# Patient Record
Sex: FEMALE | Race: WHITE | NOT HISPANIC OR LATINO | URBAN - METROPOLITAN AREA
[De-identification: names, ages, dates, MRNs, and addresses within clinical notes are randomized per-mention and may not be internally consistent; named-entity substitution may affect disease eponyms.]

---

## 2017-01-12 ENCOUNTER — OFFICE VISIT (OUTPATIENT)
Dept: DERMATOLOGY | Facility: CLINIC | Age: 67
End: 2017-01-12

## 2017-01-12 DIAGNOSIS — L25.9 CONTACT DERMATITIS, UNSPECIFIED CONTACT DERMATITIS TYPE, UNSPECIFIED TRIGGER: Primary | ICD-10-CM

## 2017-01-12 RX ORDER — AZITHROMYCIN 250 MG/1
TABLET, FILM COATED ORAL
COMMUNITY

## 2017-01-12 RX ORDER — TACROLIMUS 1 MG/G
OINTMENT TOPICAL
COMMUNITY
Start: 2016-08-08

## 2017-01-12 RX ORDER — EPINASTINE HCL 0.05 %
DROPS OPHTHALMIC (EYE)
COMMUNITY
Start: 2016-12-02

## 2017-01-12 ASSESSMENT — PAIN SCALES - GENERAL: PAINLEVEL: NO PAIN (0)

## 2017-01-12 NOTE — NURSING NOTE
Dermatology Rooming Note    Kortney Yanira Shaina's goals for this visit include:   Chief Complaint   Patient presents with     Derm Problem     Kortney comes to clinic for dermatitis. Kortney states her eyes have been affected since April     Sushma Sandhu LPN

## 2017-01-12 NOTE — Clinical Note
1/12/2017       RE: Kortney Merritt  [address]  [facility] [address]     Dear Colleague,    Thank you for referring your patient, Kortney Merritt, to the [facility] DERMATOLOGY at [facility]. Please see a copy of my visit note below.    [facility] Dermatology Note      Dermatology Problem List:   1. Presumed Allergic Contact Dermatitis: Eyelids. Unclear precipitant/contactant. Bilateral dramatic eyelid swelling, erythema, discharge and overall presentation consisten..  Previously negative T.R.U.E. tests and prick testing with Allergy.  Concern regarding topical medication drops (preservatives) and punctal plugs placed just prior to eruption. Referred to [facility] for patch testing.      Encounter Date:  01/12/2017.      CC:  Eye irritation.      History of Present Illness:   Kortney Merritt is a pleasant 66-year-old woman who is seen today in self referral as a new patient for evaluation of eye irritation.  She was in her previous state of health until April, at which point she developed significant and progressive eye irritation.  She has notably had swelling, edema, erythema, pruritus of the bilateral upper and lower eyelids, as well as persistent drainage of the surrounding eyelid skin and eyes themselves.  This notably occurred approximately 1-1-1/2 months after having tear duct plugs placed for chronic dry eye by her ophthalmologist.  At the time that she was developing this rash, she was using Refresh eye drops.  She subsequently transitioned to Systane and then followup GenTeal drops.  She was T.R.U.E. tested to the T.R.U.E. panel as well as the Refresh and GenTeal drops without any significant reactions.  She had negative prick testing performed by her allergist, Dr. Martin.  She at one point was using Maxitrol which is neomycin, polymyxin and dexamethasone ointment without any improvement and now with significant worsening, as well as tobramycin.  She  "is not using any topical antibiotics at this point.  She has never had any issues/allergies with metal exposures.  No other areas of skin involvement.  No difficulties on the head, neck, scalp and hands, among others.  She is significantly bothered by her symptoms both esthetically as well as symptomatically and is hoping that we can figure out what this is.  She has questions whether or not these could be related to the eye plugs that she has had placed but she has been reassured by her ophthalmologist that this is less likely.  She prefers that we do not contact the ophthalmologist until we know what the cause of this is.  At this point, she is using Protopic ointment once daily and epinastine drops which have been mildly palliative for her.  She stopped all other topicals.  She is no longer using any moisturizers.  Intermittently previously she was using castor oil and coconut oil without any significant improvement in her irritation.      Past Medical History:   She had \"rashes and blisters\" in childhood that resolved.      Social History:   She is a retired [employer] .  She has hobbies that include gardening as well as knitting, outdoors.  No significant exposures beyond gardening in the time frame when this started.      Family History:   Negative for dermatologic difficulties.  No one else with an itchy rash in the family.     Current Outpatient Prescriptions   Medication     autologous serum compounded ophthalmic solution     epinastine HCl (ELESTAT) 0.05 % SOLN     hypromellose (GENTEAL) ophthalmic gel 0.3%     Polyethyl Glycol-Propyl Glycol (SYSTANE ULTRA PF OP)     tacrolimus (PROTOPIC) 0.1 % ointment     Lifitegrast (XIIDRA) 5 % SOLN     azithromycin (ZITHROMAX) 250 MG tablet     NO ACTIVE MEDICATIONS     No current facility-administered medications for this visit.      Allergies   Allergen Reactions     Sulfa Drugs Hives         Review of Systems:   A 4-point " review of systems is negative beyond that stated above in HPI.  No fevers, chills, sweats, ulcerations, among others.      Physical exam:   GENERAL:  A well-appearing woman in no acute distress.  Appearance is stated age.  Affect normal.     SKIN:  A skin examination, limited today to the scalp, head and neck, upper chest, back, upper extremities, lower extremities and palms, was performed today.    -Notably, periocular skin and eyelids are significantly edematous, pink, weeping with mild crusting and mattering.  Conjunctivae are largely within normal limits.  The erythema and skin inflammation appears to be focally worse over the medial canthus.   -No other notable concerning findings on examination today.  No other rash.      Impression/Plan:   1. Presumed Allergic Contact Dermatitis, eyelids.     History and presentation is most consistent with allergic contact dermatitis. Impressive/dramatic presentation today.   Negative T.R.U.E. testing and testing to Refresh and GenTeal drops previously.  At this point, we are most concerned about reaction to preservative/component of occular drops vs. less likely recently placed punctal plugs ~1 month prior to developing this eruption.  Although her two consistently used occular drops were negative with patch testing, ophthalmic solutions are notorious for negative patch test results.   -After visit in clinic today, Dr. Rod from [facility] patch test clinic was curbsided to discuss, and recommended 2 week BID use test to the volar arms. Will call patient to discuss these recommendations.   -In addition, will refer the patient for extensive patch testing at [facility] with either Dr. Edwards or Dr. Mendy Rod. This could potentially be discontinued if use test is fruitful.  The patient will bring in all of her products that she has used on the skin over the last year.  She understands she should continue with her previous topical regimen including Protopic  each day at bedtime and epinastine drops.    -She was encouraged to contact her ophthalmologist to determine material of punctal plugs and/or obtain a sample. We assume this is silicone (highly unlikely to be allergenic) but would be important to confirm.  -The patient is understanding of our plan.  All questions were answered to apparent satisfaction.    -Will follow up in 2 months following patch testing.      Dr. Janee Hand staffed the patient.     This note was dictated and edited by MD Alireza Valera MD  PGY2 Dermatology  [phone]        Staff Involved:   Dictated by Resident (Alireza Thayer MD)/Staff(as above)     .I was present for key portions of the history and exam.  See resident note for pertinent history, exam, and treatment plan.  Janee Hand MD

## 2017-01-12 NOTE — PATIENT INSTRUCTIONS
[place] [facility]  [facility]  [address], [address]  [facility] [address]  Hours are:  8AM - 4:30PM M-F  Call to make Appointment : [phone]  with Dr. Rod or Dr. Rod  For more information and parking:  [email or web address] [place] [facility]/clinics/[place] [facility]_[email or web address]

## 2017-01-12 NOTE — PROGRESS NOTES
[facility] Dermatology Note      Dermatology Problem List:   1. Presumed Allergic Contact Dermatitis: Eyelids. Unclear precipitant/contactant. Bilateral dramatic eyelid swelling, erythema, discharge and overall presentation consisten..  Previously negative T.R.U.E. tests and prick testing with Allergy.  Concern regarding topical medication drops (preservatives) and punctal plugs placed just prior to eruption. Referred to [facility] for patch testing.      Encounter Date:  01/12/2017.      CC:  Eye irritation.      History of Present Illness:   Kortney Merritt is a pleasant 66-year-old woman who is seen today in self referral as a new patient for evaluation of eye irritation.  She was in her previous state of health until April, at which point she developed significant and progressive eye irritation.  She has notably had swelling, edema, erythema, pruritus of the bilateral upper and lower eyelids, as well as persistent drainage of the surrounding eyelid skin and eyes themselves.  This notably occurred approximately 1-1-1/2 months after having tear duct plugs placed for chronic dry eye by her ophthalmologist.  At the time that she was developing this rash, she was using Refresh eye drops.  She subsequently transitioned to Systane and then followup GenTeal drops.  She was T.R.U.E. tested to the T.R.U.E. panel as well as the Refresh and GenTeal drops without any significant reactions.  She had negative prick testing performed by her allergist, Dr. Martin.  She at one point was using Maxitrol which is neomycin, polymyxin and dexamethasone ointment without any improvement and now with significant worsening, as well as tobramycin.  She is not using any topical antibiotics at this point.  She has never had any issues/allergies with metal exposures.  No other areas of skin involvement.  No difficulties on the head, neck, scalp and hands, among others.  She is significantly bothered by her symptoms both esthetically as  "well as symptomatically and is hoping that we can figure out what this is.  She has questions whether or not these could be related to the eye plugs that she has had placed but she has been reassured by her ophthalmologist that this is less likely.  She prefers that we do not contact the ophthalmologist until we know what the cause of this is.  At this point, she is using Protopic ointment once daily and epinastine drops which have been mildly palliative for her.  She stopped all other topicals.  She is no longer using any moisturizers.  Intermittently previously she was using castor oil and coconut oil without any significant improvement in her irritation.      Past Medical History:   She had \"rashes and blisters\" in childhood that resolved.      Social History:   She is a retired [employer] .  She has hobbies that include gardening as well as knitting, outdoors.  No significant exposures beyond gardening in the time frame when this started.      Family History:   Negative for dermatologic difficulties.  No one else with an itchy rash in the family.     Current Outpatient Prescriptions   Medication     autologous serum compounded ophthalmic solution     epinastine HCl (ELESTAT) 0.05 % SOLN     hypromellose (GENTEAL) ophthalmic gel 0.3%     Polyethyl Glycol-Propyl Glycol (SYSTANE ULTRA PF OP)     tacrolimus (PROTOPIC) 0.1 % ointment     Lifitegrast (XIIDRA) 5 % SOLN     azithromycin (ZITHROMAX) 250 MG tablet     NO ACTIVE MEDICATIONS     No current facility-administered medications for this visit.      Allergies   Allergen Reactions     Sulfa Drugs Hives         Review of Systems:   A 4-point review of systems is negative beyond that stated above in HPI.  No fevers, chills, sweats, ulcerations, among others.      Physical exam:   GENERAL:  A well-appearing woman in no acute distress.  Appearance is stated age.  Affect normal.     SKIN:  A skin examination, limited today to " the scalp, head and neck, upper chest, back, upper extremities, lower extremities and palms, was performed today.    -Notably, periocular skin and eyelids are significantly edematous, pink, weeping with mild crusting and mattering.  Conjunctivae are largely within normal limits.  The erythema and skin inflammation appears to be focally worse over the medial canthus.   -No other notable concerning findings on examination today.  No other rash.      Impression/Plan:   1. Presumed Allergic Contact Dermatitis, eyelids.     History and presentation is most consistent with allergic contact dermatitis. Impressive/dramatic presentation today.   Negative T.R.U.E. testing and testing to Refresh and GenTeal drops previously.  At this point, we are most concerned about reaction to preservative/component of occular drops vs. less likely recently placed punctal plugs ~1 month prior to developing this eruption.  Although her two consistently used occular drops were negative with patch testing, ophthalmic solutions are notorious for negative patch test results.   -After visit in clinic today, Dr. Rod from [facility] patch test clinic was curbsided to discuss, and recommended 2 week BID use test to the volar arms. Will call patient to discuss these recommendations.   -In addition, will refer the patient for extensive patch testing at [facility] with either Dr. Edwards or Dr. Mendy Rod. This could potentially be discontinued if use test is fruitful.  The patient will bring in all of her products that she has used on the skin over the last year.  She understands she should continue with her previous topical regimen including Protopic each day at bedtime and epinastine drops.    -She was encouraged to contact her ophthalmologist to determine material of punctal plugs and/or obtain a sample. We assume this is silicone (highly unlikely to be allergenic) but would be important to confirm.  -The patient is understanding  of our plan.  All questions were answered to apparent satisfaction.    -Will follow up in 2 months following patch testing.      Dr. Janee Hand staffed the patient.     This note was dictated and edited by MD Alireza Valera MD  PGY2 Dermatology  [phone]        Staff Involved:   Dictated by Resident (Alireza Thayer MD)/Staff(as above)     .I was present for key portions of the history and exam.  See resident note for pertinent history, exam, and treatment plan.  Janee Hand MD

## 2017-01-12 NOTE — MR AVS SNAPSHOT
"              After Visit Summary   1/12/2017    Kortney Merritt    MRN: [id number]           Patient Information     Date Of Birth          1950        Visit Information        Provider Department      1/12/2017 9:00 AM Alireza Thayer MD [facility] Dermatology        Today's Diagnoses     Contact dermatitis, unspecified contact dermatitis type, unspecified trigger    -  1       Care Instructions    [place] [facility]  [facility]  [address], [address] [place] [address]  Hours are:  8AM - 4:30PM M-F  Call to make Appointment : [phone]  with Dr. Rod or Dr. Rod  For more information and parking:  [email or web address]/[email or web address]              Follow-ups after your visit        Additional Services     [facility] Test Referral       [facility]  [address] [facility], [address], [facility] [address]    Mendy Rod M.D., M.S.  Sima Cyr M.D.    Shanna \"Necy\" [facility] Coordinator  [phone]    You are being referred to the [facility] for Patch Testing.  They should be calling you within 10 days.  If you have NOT heard from them after 10 days, PLEASE CALL THEM at the number listed above.  If the phone is not answered \"live\", please leave a message with your name and contact information and Gwen will call you back.    Thank you,  [facility] Staff                  Follow-up notes from your care team     Return in about 2 months (around 3/12/2017).      Your next 10 appointments already scheduled     Mar 23, 2017 10:30 AM   (Arrive by 10:15 AM)   Return Visit with Alireza Thayer MD   [facility] ([facility])    [address] [facility]  [address] [facility] [address] [phone]              Who to contact     Please call " your clinic at [phone] to:    Ask questions about your health    Make or cancel appointments    Discuss your medicines    Learn about your test results    Speak to your doctor   If you have compliments or concerns about an experience at your clinic, or if you wish to file a complaint, please contact [facility] Patient Relations at [phone] or email us at [email or web address]         Additional Information About Your Visit        MyChart Information     [employer] gives you secure access to your electronic health record. If you see a primary care provider, you can also send messages to your care team and make appointments. If you have questions, please call your primary care clinic.  If you do not have a primary care provider, please call [phone] and they will assist you.      [employer] is an electronic gateway that provides easy, online access to your medical records. With [employer], you can request a clinic appointment, read your test results, renew a prescription or communicate with your care team.     To access your existing account, please contact your [facility] or call [phone] for assistance.        Care EveryWhere ID     This is your Care EveryWhere ID. This could be used by other organizations to access your [facility] medical records  [id number]         Blood Pressure from Last 3 Encounters:   01/12/13 137/92    Weight from Last 3 Encounters:   01/12/13 70.308 kg (155 lb)              We Performed the Following     [facility]        Primary Care Provider Office Phone # Fax #    [facility] [phone]       [address] [place] [address]        Thank you!     Thank you for choosing [facility] DERMATOLOGY  for your care. Our goal is always to provide you with excellent care. Hearing back from our patients is one way we can continue to improve our services. Please take a  few minutes to complete the written survey that you may receive in the mail after your visit with us. Thank you!             Your Updated Medication List - Protect others around you: Learn how to safely use, store and throw away your medicines at www.disposemymeds.org.          This list is accurate as of: 1/12/17  9:24 AM.  Always use your most recent med list.                   Brand Name Dispense Instructions for use    autologous serum compounded ophthalmic solution          azithromycin 250 MG tablet    ZITHROMAX     One time monthly       epinastine HCl 0.05 % Soln    ELESTAT         hypromellose 0.3 % Gel ophthalmic gel    GENTEAL         NO ACTIVE MEDICATIONS          SYSTANE ULTRA PF OP          tacrolimus 0.1 % ointment    PROTOPIC         XIIDRA 5 % Soln   Generic drug:  Lifitegrast

## 2017-01-14 ENCOUNTER — TELEPHONE (OUTPATIENT)
Dept: DERMATOLOGY | Facility: CLINIC | Age: 67
End: 2017-01-14

## 2017-01-14 NOTE — TELEPHONE ENCOUNTER
Patient was called to discuss further diagnostic options for her eye dermatitis. As recommended by Dr. Mendy Rod, we encouraged her to perform a BID use test for the next 2 weeks. All previously used eye drops should be applied to the volar forearm for 2 weeks. This will assist with patch testing as ophthalmic drops are notorious for false negative patch testing. Patient plans to pursue this. Will keep in touch regarding results.     Alireza Thayer MD  PGY2 Dermatology  [phone]

## 2017-01-27 ENCOUNTER — TELEPHONE (OUTPATIENT)
Dept: DERMATOLOGY | Facility: CLINIC | Age: 67
End: 2017-01-27

## 2017-02-03 ENCOUNTER — TELEPHONE (OUTPATIENT)
Dept: DERMATOLOGY | Facility: CLINIC | Age: 67
End: 2017-02-03

## 2017-02-03 NOTE — TELEPHONE ENCOUNTER
I called [facility] and they confirmed they received PAtch test referral, I also spoke with Yanira to inform her that [facility] had received her referral.

## 2017-03-23 ENCOUNTER — OFFICE VISIT (OUTPATIENT)
Dept: DERMATOLOGY | Facility: CLINIC | Age: 67
End: 2017-03-23

## 2017-03-23 DIAGNOSIS — L01.00 IMPETIGO: ICD-10-CM

## 2017-03-23 DIAGNOSIS — L23.9 ALLERGIC CONTACT DERMATITIS, UNSPECIFIED TRIGGER: Primary | ICD-10-CM

## 2017-03-23 RX ORDER — CEPHALEXIN 500 MG/1
500 CAPSULE ORAL 3 TIMES DAILY
Qty: 45 CAPSULE | Refills: 0 | Status: SHIPPED | OUTPATIENT
Start: 2017-03-23

## 2017-03-23 RX ORDER — HYDROCORTISONE 25 MG/G
OINTMENT TOPICAL
Qty: 30 G | Refills: 1 | Status: SHIPPED | OUTPATIENT
Start: 2017-03-23

## 2017-03-23 ASSESSMENT — PAIN SCALES - GENERAL: PAINLEVEL: SEVERE PAIN (6)

## 2017-03-23 NOTE — NURSING NOTE
"Dermatology Rooming Note    Kortney Merritt's goals for this visit include:   Chief Complaint   Patient presents with     Derm Problem     Kortney is here for post patch testing f/u, states \" My eyes are still bothering me.\"     Sushma Sandhu LPN  "

## 2017-03-23 NOTE — LETTER
3/23/2017     RE: Kortney Merritt  [address]  [facility] [address]     Dear Colleague,    Thank you for referring your patient, Kortney Merritt, to the [facility] DERMATOLOGY at [facility]. Please see a copy of my visit note below.    [facility] Dermatology Note         Dermatology Problem List:   1.  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  Recent patch testing as noted below.  Current treatment includes hydrocortisone 2.5% ointment b.i.d., oral Keflex for mild overlying impetiginization and Systane Ultra preservative-free eyedrops.  Consideration of removal of punctal plugs recommended.         Encounter Date: 03/23/2017         CC: Followup eyelid dermatitis.         History of Present Illness:   Kortney Merritt is a pleasant 66-year-old woman who presents today in followup for her eyelid rash.  She was last seen 01/12/2017.  At that point, we referred her to [facility] for patch testing.  She had this performed by Dr. Shaila Edwards approximately 6 weeks ago.  She notably had a positive reaction to both gold and formaldehyde.  Formaldehyde was in her aloe vera hand lotion that she was using.  She does not have any notable gold exposures.  She also had doubtful positive reactions to methyldibromo glutaronitrile, phenoxyethanol, phenylmercuric acetate, and benzalkonium chloride.  Apparently benzalkonium chloride was in her Epinastine eyedrops which she has since discontinued for the last 6 weeks.  Presently she is using Protopic ointment once or twice daily to the bilateral eyelids.  She is using Systane Ultra preservative-free eyedrops which contain only propylene glycol and polyethylene glycol alone without any preservatives.  She is not using any topical antibiotics.  No other contactants.  She has stopped using her hand lotion.  Does not wear any gold  jewelry.  Still despite this, her eyelids have not particularly improved.  They are 6/10 today.  She notes yellow crusting and overlying mattering.  She has not had any fevers, chills, sweats or otherwise infectious symptoms.  She is overall frustrated and is considering removal of the punctal plugs.  She notes her eye dryness has not gotten any better since these were placed and her eyelid dermatitis has been stable or worsening over that time period, not previously there before the punctal plugs were placed.         No past medical history on file.  ACD as above.     Allergies   Allergen Reactions     Formaldehyde Other (See Comments)     Positive (+) skin patch test     Methyldibromoglutaronitrile Other (See Comments)     Doubtful Positive (+) Skin Patch Test     Other  [No Clinical Screening - See Comments] Other (See Comments)     Gold; Positive (+) skin patch test     Phenoxyethanol Other (See Comments)     Doubtful Positive (+) Skin Patch Test     Phenylmercuric Acetate Other (See Comments)     Doubtful Positive (+) Skin Patch Test     Sulfa Drugs Hives, Rash and Itching            Review of Systems:   A 4-point review of systems is negative beyond what is stated above in HPI.         Physical exam:   GENERAL:  A well-appearing woman in no acute distress.  Appears her stated age.     SKIN:  Focused skin examination today including head and neck, hands and forearms provided today.  Notably bilateral eyelids with significant erythema with overlying honey-colored crusting both on the superior and inferior lids.  Left lids more significantly involved today.  Left medial superior canthus and lid with increased induration and infiltration with loss of hair over that site.  Overall, the conjunctivae are fairly clear, though she does have a somewhat boggy, allergic appearing conjunctiva over the medial aspects of the conjunctiva bilaterally.  No other rash noted today.  No other concerning findings.          Impression/Plan:   1.  Allergic contact dermatitis with overlying impetiginization.  Bilateral eyelids.  Status post patch testing as noted above.  Unfortunately, slow to improve status post allergen avoidance.  At this point, she has overlying impetiginization which is likely complicating her picture.  It is unclear whether or not lack of improvement is secondary to ongoing allergic dermatitis versus secondary infection.  It would help to avoid any topical antibiotics as some topical treatments could muddy the water at this point.      -Instead will prescribe Keflex 500 mg t.i.d. for the next 2 weeks.    -A bacterial swab was performed today to rule out any antibiotic resistant bacteria.    -In place of Protopic, we will have her transition to 2.5% hydrocortisone ointment to use twice daily for the next 3 weeks.  -Follow up in 3 weeks.    -Continue to avoid known allergens, particularly gold, formaldehyde and products containing benzalkonium chloride preservative.    -She can continue with Systane Ultra preservative-free eyedrops alone without any other eye drops recommended at this point.    -Will strongly consider removal of punctal plugs if not significantly improved after treatment of impetigo.         The patient was seen and discussed with Dr. Janee Hand who was staff for this encounter.  All questions were answered to her apparent satisfaction today.  Handout was provided in detail.          Follow-up in 3 weeks or sooner as needed.         Staff Involved:   Dictated by Resident(Alireza Thayer MD)/Staff(as above)     This note was dictated and edited by MD Alireza Valera MD  PGY2 Dermatology  [phone]   .I, Janee Hand MD, saw this patient with the resident and agree with the resident s findings and plan of care as documented in the resident s note.    Again, thank you for allowing me to participate in the care of your patient.      Sincerely,    Alireza Thayer MD

## 2017-03-23 NOTE — MR AVS SNAPSHOT
After Visit Summary   3/23/2017    Kortney Merritt    MRN: [id number]           Patient Information     Date Of Birth          1950        Visit Information        Provider Department      3/23/2017 10:30 AM Alireza Thayer MD [facility] Dermatology        Today's Diagnoses     Allergic contact dermatitis, unspecified trigger    -  1    Impetigo          Care Instructions    Stop with the protopic.  Instead apply 2.5% hydrocortisone ointment twice daily until 3 week f/u.   Continue with systane Ultra drops alone.   Swab today to look for bacteria.   Start keflex antibiotic three times daily for 2 weeks.  No other topical products.   Consider wearing vinyl gloves with exposure to known allergens  Consider removal of punctal plugs.         Follow-ups after your visit        Follow-up notes from your care team     Return in about 3 weeks (around 4/13/2017).      Future tests that were ordered for you today     Open Future Orders        Priority Expected Expires Ordered    Skin Culture Aerobic Bacterial Routine  3/23/2018 3/23/2017            Who to contact     Please call your clinic at [phone] to:    Ask questions about your health    Make or cancel appointments    Discuss your medicines    Learn about your test results    Speak to your doctor   If you have compliments or concerns about an experience at your clinic, or if you wish to file a complaint, please contact [facility] Patient Relations at [phone] or email us at [email or web address]         Additional Information About Your Visit        MyChart Information     [employer] gives you secure access to your electronic health record. If you see a primary care provider, you can also send messages to your care team and make appointments. If you have questions, please call your primary care clinic.  If you do not have a primary care provider, please call [phone] and they will assist you.       [employer] is an electronic gateway that provides easy, online access to your medical records. With [employer], you can request a clinic appointment, read your test results, renew a prescription or communicate with your care team.     To access your existing account, please contact your [facility] or call [phone] for assistance.        Care EveryWhere ID     This is your Care EveryWhere ID. This could be used by other organizations to access your [facility] medical records  [id number]         Blood Pressure from Last 3 Encounters:   01/12/13 (!) 137/92    Weight from Last 3 Encounters:   01/12/13 70.3 kg (155 lb)                 Today's Medication Changes          These changes are accurate as of: 3/23/17 11:04 AM.  If you have any questions, ask your nurse or doctor.               Start taking these medicines.        Dose/Directions    cephALEXin 500 MG capsule   Commonly known as:  KEFLEX   Used for:  Impetigo        Dose:  500 mg   Take 1 capsule (500 mg) by mouth 3 times daily   Quantity:  45 capsule   Refills:  0       hydrocortisone 2.5 % ointment   Used for:  Allergic contact dermatitis, unspecified trigger        Apply twice daily to eyelids for the next 3 weeks   Quantity:  30 g   Refills:  1            Where to get your medicines      These medications were sent to [place] [address] [place] [id number]     Phone:  [phone]     cephALEXin 500 MG capsule    hydrocortisone 2.5 % ointment                Primary Care Provider Office Phone # Fax #    [facility] [phone]       [address] [place] [address]        Thank you!     Thank you for choosing [facility] DERMATOLOGY  for your care. Our goal is always to provide you with excellent care. Hearing back from our patients is one way we can continue to improve our services. Please take a few minutes to complete the  written survey that you may receive in the mail after your visit with us. Thank you!             Your Updated Medication List - Protect others around you: Learn how to safely use, store and throw away your medicines at www.disposemymeds.org.          This list is accurate as of: 3/23/17 11:04 AM.  Always use your most recent med list.                   Brand Name Dispense Instructions for use    autologous serum compounded ophthalmic solution          azithromycin 250 MG tablet    ZITHROMAX     One time monthly       calcium carbonate 1500 (600 CA) MG tablet    OS-YOSHI 600 mg [place]. Ca     Take 600 mg by mouth       cephALEXin 500 MG capsule    KEFLEX    45 capsule    Take 1 capsule (500 mg) by mouth 3 times daily       CVS VITAMIN D3 400 UNITS Caps   Generic drug:  Cholecalciferol          epinastine HCl 0.05 % Soln    ELESTAT         hydrocortisone 2.5 % ointment     30 g    Apply twice daily to eyelids for the next 3 weeks       hypromellose 0.3 % Gel ophthalmic gel    GENTEAL         NO ACTIVE MEDICATIONS          SYSTANE ULTRA PF OP          tacrolimus 0.1 % ointment    PROTOPIC         XIIDRA 5 % Soln   Generic drug:  Lifitegrast

## 2017-03-23 NOTE — PROGRESS NOTES
[facility] Dermatology Note         Dermatology Problem List:   1.  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  Recent patch testing as noted below.  Current treatment includes hydrocortisone 2.5% ointment b.i.d., oral Keflex for mild overlying impetiginization and Systane Ultra preservative-free eyedrops.  Consideration of removal of punctal plugs recommended.         Encounter Date: 03/23/2017         CC: Followup eyelid dermatitis.         History of Present Illness:   Kortney Merritt is a pleasant 66-year-old woman who presents today in followup for her eyelid rash.  She was last seen 01/12/2017.  At that point, we referred her to [facility] for patch testing.  She had this performed by Dr. Shaila Edwards approximately 6 weeks ago.  She notably had a positive reaction to both gold and formaldehyde.  Formaldehyde was in her aloe vera hand lotion that she was using.  She does not have any notable gold exposures.  She also had doubtful positive reactions to methyldibromo glutaronitrile, phenoxyethanol, phenylmercuric acetate, and benzalkonium chloride.  Apparently benzalkonium chloride was in her Epinastine eyedrops which she has since discontinued for the last 6 weeks.  Presently she is using Protopic ointment once or twice daily to the bilateral eyelids.  She is using Systane Ultra preservative-free eyedrops which contain only propylene glycol and polyethylene glycol alone without any preservatives.  She is not using any topical antibiotics.  No other contactants.  She has stopped using her hand lotion.  Does not wear any gold jewelry.  Still despite this, her eyelids have not particularly improved.  They are 6/10 today.  She notes yellow crusting and overlying mattering.  She has not had any fevers, chills, sweats or otherwise infectious symptoms.  She is overall frustrated and is considering removal of the  punctal plugs.  She notes her eye dryness has not gotten any better since these were placed and her eyelid dermatitis has been stable or worsening over that time period, not previously there before the punctal plugs were placed.         No past medical history on file.  ACD as above.     Allergies   Allergen Reactions     Formaldehyde Other (See Comments)     Positive (+) skin patch test     Methyldibromoglutaronitrile Other (See Comments)     Doubtful Positive (+) Skin Patch Test     Other  [No Clinical Screening - See Comments] Other (See Comments)     Gold; Positive (+) skin patch test     Phenoxyethanol Other (See Comments)     Doubtful Positive (+) Skin Patch Test     Phenylmercuric Acetate Other (See Comments)     Doubtful Positive (+) Skin Patch Test     Sulfa Drugs Hives, Rash and Itching            Review of Systems:   A 4-point review of systems is negative beyond what is stated above in HPI.         Physical exam:   GENERAL:  A well-appearing woman in no acute distress.  Appears her stated age.     SKIN:  Focused skin examination today including head and neck, hands and forearms provided today.  Notably bilateral eyelids with significant erythema with overlying honey-colored crusting both on the superior and inferior lids.  Left lids more significantly involved today.  Left medial superior canthus and lid with increased induration and infiltration with loss of hair over that site.  Overall, the conjunctivae are fairly clear, though she does have a somewhat boggy, allergic appearing conjunctiva over the medial aspects of the conjunctiva bilaterally.  No other rash noted today.  No other concerning findings.         Impression/Plan:   1.  Allergic contact dermatitis with overlying impetiginization.  Bilateral eyelids.  Status post patch testing as noted above.  Unfortunately, slow to improve status post allergen avoidance.  At this point, she has overlying impetiginization which is likely complicating her  picture.  It is unclear whether or not lack of improvement is secondary to ongoing allergic dermatitis versus secondary infection.  It would help to avoid any topical antibiotics as some topical treatments could muddy the water at this point.      -Instead will prescribe Keflex 500 mg t.i.d. for the next 2 weeks.    -A bacterial swab was performed today to rule out any antibiotic resistant bacteria.    -In place of Protopic, we will have her transition to 2.5% hydrocortisone ointment to use twice daily for the next 3 weeks.  -Follow up in 3 weeks.    -Continue to avoid known allergens, particularly gold, formaldehyde and products containing benzalkonium chloride preservative.    -She can continue with Systane Ultra preservative-free eyedrops alone without any other eye drops recommended at this point.    -Will strongly consider removal of punctal plugs if not significantly improved after treatment of impetigo.         The patient was seen and discussed with Dr. Janee Hand who was staff for this encounter.  All questions were answered to her apparent satisfaction today.  Handout was provided in detail.          Follow-up in 3 weeks or sooner as needed.         Staff Involved:   Dictated by Resident(Alireza Thayer MD)/Staff(as above)     This note was dictated and edited by MD Alireza Valera MD  PGY2 Dermatology  [phone]   .I, Janee Hand MD, saw this patient with the resident and agree with the resident s findings and plan of care as documented in the resident s note.

## 2017-03-23 NOTE — PATIENT INSTRUCTIONS
Stop with the protopic.  Instead apply 2.5% hydrocortisone ointment twice daily until 3 week f/u.   Continue with systane Ultra drops alone.   Swab today to look for bacteria.   Start keflex antibiotic three times daily for 2 weeks.  No other topical products.   Consider wearing vinyl gloves with exposure to known allergens  Consider removal of punctal plugs.

## 2017-03-25 LAB
BACTERIA SPEC CULT: NORMAL
Lab: NORMAL
MICRO REPORT STATUS: NORMAL
SPECIMEN SOURCE: NORMAL

## 2017-03-28 ENCOUNTER — TELEPHONE (OUTPATIENT)
Dept: DERMATOLOGY | Facility: CLINIC | Age: 67
End: 2017-03-28

## 2017-03-28 NOTE — TELEPHONE ENCOUNTER
Called to discuss results of aerobic skin culture of the eyelids as SELENA. Ms. Merritt was last seen 3/23/17 by Dr. Thayer and Dr. Palm, she was felt to have secondary impetiginization of ACD and prescribed cephalexin x 3 weeks. Despite normal angeles growth, encouraged Ms. Merritt to continue course of cephalexin as cultures prone to sampling error and clinical dx c/w impetigo. She reported she had improved a small amount since visit.    Tatyana Leija MD  PGY-3 Dermatology  Pager: [phone]      Culture data:   Specimen Description 03/23/2017 10:30 AM 73     Skin Bi lat eyelids     Special Requests 03/23/2017 10:30 AM 73     Specimen collected in eSwab transport (white cap)     Culture Micro 03/23/2017 10:30      Light growth Normal skin angeles     Micro Report Status 03/23/2017 10:30      FINAL 03/25/2017

## 2017-04-17 ENCOUNTER — TRANSFERRED RECORDS (OUTPATIENT)
Dept: HEALTH INFORMATION MANAGEMENT | Facility: CLINIC | Age: 67
End: 2017-04-17

## 2017-04-27 ENCOUNTER — OFFICE VISIT (OUTPATIENT)
Dept: DERMATOLOGY | Facility: CLINIC | Age: 67
End: 2017-04-27

## 2017-04-27 DIAGNOSIS — L23.9 ALLERGIC CONTACT DERMATITIS, UNSPECIFIED TRIGGER: Primary | ICD-10-CM

## 2017-04-27 DIAGNOSIS — H01.119 ALLERGIC BLEPHARITIS, UNSPECIFIED LATERALITY: ICD-10-CM

## 2017-04-27 ASSESSMENT — PAIN SCALES - GENERAL: PAINLEVEL: NO PAIN (0)

## 2017-04-27 NOTE — MR AVS SNAPSHOT
After Visit Summary   4/27/2017    Kortney Merritt    MRN: [id number]           Patient Information     Date Of Birth          1950        Visit Information        Provider Department      4/27/2017 10:00 AM Alireza Thayer MD [facility] Dermatology        Today's Diagnoses     Allergic contact dermatitis, unspecified trigger    -  1    Allergic blepharitis, unspecified laterality          Care Instructions    Restart the azithromycin    Continue with gentle eye drops and wash as you have been using    Continue with twice daily hydrocortisone to the lids and scaly area on the cheek/nose.     Okay to restart tobradex    Biopsy as scheduled 5/15/17    F/u in ~1 month          Follow-ups after your visit        Follow-up notes from your care team     Return in about 5 weeks (around 6/1/2017).      Your next 10 appointments already scheduled     Jun 01, 2017 10:30 AM CDT   (Arrive by 10:15 AM)   Return Visit with Alireza Thayer MD   [facility] Dermatology ([facility])    [address] [place] [id number] [phone]              Who to contact     Please call your clinic at [phone] to:    Ask questions about your health    Make or cancel appointments    Discuss your medicines    Learn about your test results    Speak to your doctor   If you have compliments or concerns about an experience at your clinic, or if you wish to file a complaint, please contact [facility] Patient Relations at [phone] or email us at [email or web address]         Additional Information About Your Visit        MyChart Information     [employer] gives you secure access to your electronic health record. If you see a primary care provider, you can also send messages to your care team and make appointments. If you have questions, please call your primary care clinic.  If you do not have a primary care provider, please call  [phone] and they will assist you.      [employer] is an electronic gateway that provides easy, online access to your medical records. With [employer], you can request a clinic appointment, read your test results, renew a prescription or communicate with your care team.     To access your existing account, please contact your [facility] or call [phone] for assistance.        Care EveryWhere ID     This is your Care EveryWhere ID. This could be used by other organizations to access your [facility] medical records  [id number]         Blood Pressure from Last 3 Encounters:   01/12/13 (!) 137/92    Weight from Last 3 Encounters:   01/12/13 70.3 kg (155 lb)              Today, you had the following     No orders found for display       Primary Care Provider Office Phone # Fax #    [facility] [phone]       [address] [place] [address]        Thank you!     Thank you for choosing [facility] DERMATOLOGY  for your care. Our goal is always to provide you with excellent care. Hearing back from our patients is one way we can continue to improve our services. Please take a few minutes to complete the written survey that you may receive in the mail after your visit with us. Thank you!             Your Updated Medication List - Protect others around you: Learn how to safely use, store and throw away your medicines at www.disposemymeds.org.          This list is accurate as of: 4/27/17 10:59 AM.  Always use your most recent med list.                   Brand Name Dispense Instructions for use    autologous serum compounded ophthalmic solution          azithromycin 250 MG tablet    ZITHROMAX     One time monthly       calcium carbonate 1500 (600 CA) MG tablet    OS-YOSHI 600 mg [place]. Ca     Take 600 mg by mouth       cephALEXin 500 MG capsule    KEFLEX    45 capsule    Take 1 capsule (500 mg) by mouth 3 times daily       CVS VITAMIN D3 400 UNITS Caps    Generic drug:  Cholecalciferol          epinastine HCl 0.05 % Soln    ELESTAT         hydrocortisone 2.5 % ointment     30 g    Apply twice daily to eyelids for the next 3 weeks       hypromellose 0.3 % Gel ophthalmic gel    GENTEAL         NO ACTIVE MEDICATIONS          SYSTANE ULTRA PF OP          tacrolimus 0.1 % ointment    PROTOPIC         XIIDRA 5 % Soln   Generic drug:  Lifitegrast

## 2017-04-27 NOTE — NURSING NOTE
Dermatology Rooming Note    Kortney Merritt's goals for this visit include:   Chief Complaint   Patient presents with     Derm Problem     Kortney is here today to follow up on her contact dermatitis of the eyes. She states that things are a little better.      Alexandria Salguero, [facility]

## 2017-04-27 NOTE — PATIENT INSTRUCTIONS
Restart the azithromycin    Continue with gentle eye drops and wash as you have been using    Continue with twice daily hydrocortisone to the lids and scaly area on the cheek/nose.     Okay to restart tobradex    Biopsy as scheduled 5/15/17    F/u in ~1 month

## 2017-04-27 NOTE — LETTER
4/27/2017       RE: Kortney Merritt  [address]  [facility] [address]     Dear Colleague,    Thank you for referring your patient, Kortney Merritt, to the [facility] DERMATOLOGY at [facility]. Please see a copy of my visit note below.    [facility] Dermatology Note      Dermatology Problem List:   1.  Allergic contact dermatitis/blepharitis;  bilateral eyelids. Starting 1 year ago, status post punctal plug placement for xerophthalmia.  Recent patch testing as noted below.  Present treatment includes hydrocortisone 2.5% ointment b.i.d., pulse azithromycin for blepharitis as managed by Ophthalmology, p.r.n. sustained Ultra preservative-free eyedrops.  Undergoing IPL treatment with Ophthalmology, recent removal of left-sided punctal plug 04/17.  Plan for biopsy with Oculoplastics 05/15.         Encounter Date:  04/27/2017       CC:  Followup eyelid rash.        History of Present Illness:   Ms. Merritt is a pleasant, 66-year-old female who presents today in followup for eyelid dermatitis.  She was last seen 03/23/2017.  Since she was last seen, she has been followed closely with Ophthalmology and Oculoplastics.  She has subsequently had a left-sided punctal plug removed 04/17 with attempt to test whether or not this could be possibly associated with her eye and skin reaction.  Since we last saw her, she has been on twice-daily hydrocortisone 2.5% ointment.  She is using Systane Ultrasound eyedrops alone with a gentle eyelid cleanser recommended by Ophthalmology.  She is no longer on any oral antihistamines.  She is not using epinastine drops or any of the previous topicals that she was found to be allergic to on patch testing at [facility].  She recently underwent IPL treatment on 04/17 and was prescribed TobraDex, which she has not started as of yet.  She is planned for a biopsy of the lesion on the left superior eyelid on 05/15 with Oculoplastics.   Given some obturation of the normal anatomical eyelid margin as well as loss of eyelashes at that site, a biopsy is indicated.        She has ongoing difficulties and issues with xerophthalmia.  She is using eyedrops every 10 minutes or so throughout the day.  She has a list of all of her topical treatments as well as ingredients,   She notes that things are better, but attributes this to the hydrocortisone ointment today.  Certainly not worsening of fevers, chills, sweats or other concerns.        Previous patch testing at [facility] performed by Dr. Shaila Edwards in 12/2016.  Positive formaldehyde and aloe vera, which is in her aloe vera hand lotion.  Positive gold.  Doubtful to methyldibromo glutaronitrile, phenoxyethanol and phenylmercuric acetate as well as benzalkonium chloride.  Benzalkonium chloride was in her epinastine drops that she was previously using.          History reviewed. No pertinent past medical history.    Current Outpatient Prescriptions   Medication     calcium carbonate (OS-YOSHI 600 MG [place]. CA) 1500 (600 CA) MG tablet     Cholecalciferol (CVS VITAMIN D3) 400 UNITS CAPS     cephALEXin (KEFLEX) 500 MG capsule     hydrocortisone 2.5 % ointment     autologous serum compounded ophthalmic solution     epinastine HCl (ELESTAT) 0.05 % SOLN     hypromellose (GENTEAL) ophthalmic gel 0.3%     Polyethyl Glycol-Propyl Glycol (SYSTANE ULTRA PF OP)     tacrolimus (PROTOPIC) 0.1 % ointment     Lifitegrast (XIIDRA) 5 % SOLN     azithromycin (ZITHROMAX) 250 MG tablet     NO ACTIVE MEDICATIONS     No current facility-administered medications for this visit.       Allergies   Allergen Reactions     Formaldehyde Other (See Comments)     Positive (+) skin patch test     Methyldibromoglutaronitrile Other (See Comments)     Doubtful Positive (+) Skin Patch Test     Other  [No Clinical Screening - See Comments] Other (See Comments)     Gold; Positive (+) skin patch test     Phenoxyethanol Other (See Comments)      Doubtful Positive (+) Skin Patch Test     Phenylmercuric Acetate Other (See Comments)     Doubtful Positive (+) Skin Patch Test     Sulfa Drugs Hives, Rash and Itching         Review of Systems:   A 4 point review of systems was negative beyond that stated above in the HPI.      Physical exam:   GENERAL:  A well-appearing woman in no acute distress, appearing her stated age, cooperative with the exam.   SKIN:  A focused examination of the skin today, including the head, neck, hands and forearms, is provided today.  Notably, bilateral eyelids with prominent erythema along the eyelid margins as well as somewhat on the surrounding skin.  There is a large focus of swelling with loss of eyelashes along the superior mid lid margin.  No discrete papule, but rather with appearance of deep dermal swelling.  She has clear conjunctivae, though is tearing throughout the examination today after eyedrop placement.  Mild, thin, pink, scaly plaque on the right medial cheek and lateral proximal nose.  Much less erythematous scaly/crusty than previously noted.  No signs of superinfection as noted previously.       Impression/Plan:   1.  Allergic contact dermatitis/chronic blepharitis.  The patient has had significant improvement since she was last seen; attributed to hydrocortisone ointment b.i.d.  Recently underwent removal of 1 punctal plug on 04/17 on the left side.  She continues to avoid all known allergens as noted and detailed in above HPI.  She is undergoing IPL treatment with Ophthalmology and has planned excisional biopsy of the left superior lid margin lesion on 05/05/2017.       Kortney continues to be frustrated with the recalcitrant nature of her eyelid dermatitis; she was reassured that things have improved significantly since we last saw her.  It is too early to determine whether or not removal of the punctal plugs have assisted on the left side, as acute dermatitis to contact exposure can persist for up to 3 weeks  after exposure.  We suspect that there may be a component of aeroallergen reaction superimposed, and it will be interesting to see whether or not removal of the punctal plug will improve her symptoms on the left side.  We agree with planned Ophthalmology biopsy of the left superior eyelid margin.  Although our concern for nonmelanoma skin cancer is quite low at this point, this biopsy will be important to rule out as well as shed more light on the underlying etiology of her difficulties.  We do have some question of whether or not the ocular rosacea, chronic blepharitis, may be superimposed in addition to the known contact dermatitis.     - At this point, we will continue hydrocortisone 2.5% ointment b.i.d.   -She should restart on azithromycin as prescribed by her outside ophthalmologist.    -Planned eyelid biopsy with Oculoplastics on 05/15, left superior lid.     - Continue with Systane Ultrasound p.r.n. eyedrops.   - It is okay to restart TobraDex and continue using in conjunction with hydrocortisone.           Follow up in 5 weeks.      Dr. Janee Hand staffed the patient.      Staff Involved:   Dictated by Resident(Alireza Thayer MD/Staff(as above)     This note was dictated and edited by MD Alireza Valera MD  PGY2 Dermatology  [phone]        cc:   Moiz Peters MD   [facility]   [address] [address]      Steve Barton MD   [facility]   [address] [place] [address]   .I, Janee Hand MD, saw this patient with the resident and agree with the resident s findings and plan of care as documented in the resident s note.

## 2017-04-27 NOTE — PROGRESS NOTES
[facility] Dermatology Note      Dermatology Problem List:   1.  Allergic contact dermatitis/blepharitis;  bilateral eyelids. Starting 1 year ago, status post punctal plug placement for xerophthalmia.  Recent patch testing as noted below.  Present treatment includes hydrocortisone 2.5% ointment b.i.d., pulse azithromycin for blepharitis as managed by Ophthalmology, p.r.n. sustained Ultra preservative-free eyedrops.  Undergoing IPL treatment with Ophthalmology, recent removal of left-sided punctal plug 04/17.  Plan for biopsy with Oculoplastics 05/15.         Encounter Date:  04/27/2017       CC:  Followup eyelid rash.        History of Present Illness:   Ms. Merritt is a pleasant, 66-year-old female who presents today in followup for eyelid dermatitis.  She was last seen 03/23/2017.  Since she was last seen, she has been followed closely with Ophthalmology and Oculoplastics.  She has subsequently had a left-sided punctal plug removed 04/17 with attempt to test whether or not this could be possibly associated with her eye and skin reaction.  Since we last saw her, she has been on twice-daily hydrocortisone 2.5% ointment.  She is using Systane Ultrasound eyedrops alone with a gentle eyelid cleanser recommended by Ophthalmology.  She is no longer on any oral antihistamines.  She is not using epinastine drops or any of the previous topicals that she was found to be allergic to on patch testing at [facility].  She recently underwent IPL treatment on 04/17 and was prescribed TobraDex, which she has not started as of yet.  She is planned for a biopsy of the lesion on the left superior eyelid on 05/15 with Oculoplastics.  Given some obturation of the normal anatomical eyelid margin as well as loss of eyelashes at that site, a biopsy is indicated.        She has ongoing difficulties and issues with xerophthalmia.  She is using eyedrops every 10 minutes or so throughout the day.  She has a list of all of  her topical treatments as well as ingredients,   She notes that things are better, but attributes this to the hydrocortisone ointment today.  Certainly not worsening of fevers, chills, sweats or other concerns.        Previous patch testing at [facility] performed by Dr. Shaila Edwards in 12/2016.  Positive formaldehyde and aloe vera, which is in her aloe vera hand lotion.  Positive gold.  Doubtful to methyldibromo glutaronitrile, phenoxyethanol and phenylmercuric acetate as well as benzalkonium chloride.  Benzalkonium chloride was in her epinastine drops that she was previously using.          History reviewed. No pertinent past medical history.    Current Outpatient Prescriptions   Medication     calcium carbonate (OS-YOSHI 600 MG [place]. CA) 1500 (600 CA) MG tablet     Cholecalciferol (CVS VITAMIN D3) 400 UNITS CAPS     cephALEXin (KEFLEX) 500 MG capsule     hydrocortisone 2.5 % ointment     autologous serum compounded ophthalmic solution     epinastine HCl (ELESTAT) 0.05 % SOLN     hypromellose (GENTEAL) ophthalmic gel 0.3%     Polyethyl Glycol-Propyl Glycol (SYSTANE ULTRA PF OP)     tacrolimus (PROTOPIC) 0.1 % ointment     Lifitegrast (XIIDRA) 5 % SOLN     azithromycin (ZITHROMAX) 250 MG tablet     NO ACTIVE MEDICATIONS     No current facility-administered medications for this visit.       Allergies   Allergen Reactions     Formaldehyde Other (See Comments)     Positive (+) skin patch test     Methyldibromoglutaronitrile Other (See Comments)     Doubtful Positive (+) Skin Patch Test     Other  [No Clinical Screening - See Comments] Other (See Comments)     Gold; Positive (+) skin patch test     Phenoxyethanol Other (See Comments)     Doubtful Positive (+) Skin Patch Test     Phenylmercuric Acetate Other (See Comments)     Doubtful Positive (+) Skin Patch Test     Sulfa Drugs Hives, Rash and Itching         Review of Systems:   A 4 point review of systems was negative beyond that stated above in the HPI.       Physical exam:   GENERAL:  A well-appearing woman in no acute distress, appearing her stated age, cooperative with the exam.   SKIN:  A focused examination of the skin today, including the head, neck, hands and forearms, is provided today.  Notably, bilateral eyelids with prominent erythema along the eyelid margins as well as somewhat on the surrounding skin.  There is a large focus of swelling with loss of eyelashes along the superior mid lid margin.  No discrete papule, but rather with appearance of deep dermal swelling.  She has clear conjunctivae, though is tearing throughout the examination today after eyedrop placement.  Mild, thin, pink, scaly plaque on the right medial cheek and lateral proximal nose.  Much less erythematous scaly/crusty than previously noted.  No signs of superinfection as noted previously.       Impression/Plan:   1.  Allergic contact dermatitis/chronic blepharitis.  The patient has had significant improvement since she was last seen; attributed to hydrocortisone ointment b.i.d.  Recently underwent removal of 1 punctal plug on 04/17 on the left side.  She continues to avoid all known allergens as noted and detailed in above HPI.  She is undergoing IPL treatment with Ophthalmology and has planned excisional biopsy of the left superior lid margin lesion on 05/05/2017.       Kortney continues to be frustrated with the recalcitrant nature of her eyelid dermatitis; she was reassured that things have improved significantly since we last saw her.  It is too early to determine whether or not removal of the punctal plugs have assisted on the left side, as acute dermatitis to contact exposure can persist for up to 3 weeks after exposure.  We suspect that there may be a component of aeroallergen reaction superimposed, and it will be interesting to see whether or not removal of the punctal plug will improve her symptoms on the left side.  We agree with planned Ophthalmology biopsy of the left  superior eyelid margin.  Although our concern for nonmelanoma skin cancer is quite low at this point, this biopsy will be important to rule out as well as shed more light on the underlying etiology of her difficulties.  We do have some question of whether or not the ocular rosacea, chronic blepharitis, may be superimposed in addition to the known contact dermatitis.     - At this point, we will continue hydrocortisone 2.5% ointment b.i.d.   -She should restart on azithromycin as prescribed by her outside ophthalmologist.    -Planned eyelid biopsy with Oculoplastics on 05/15, left superior lid.     - Continue with Systane Ultrasound p.r.n. eyedrops.   - It is okay to restart TobraDex and continue using in conjunction with hydrocortisone.           Follow up in 5 weeks.      Dr. Janee Hand staffed the patient.      Staff Involved:   Dictated by Resident(Alireza Thayer MD/Staff(as above)     This note was dictated and edited by MD Alireza Valera MD  PGY2 Dermatology  [phone]        cc:   Moiz Peters MD   [facility]   [address] [address]      Steve Barton MD   [facility]   [address] [place] [address]   .I, Janee Hand MD, saw this patient with the resident and agree with the resident s findings and plan of care as documented in the resident s note.

## 2017-06-01 ENCOUNTER — OFFICE VISIT (OUTPATIENT)
Dept: DERMATOLOGY | Facility: CLINIC | Age: 67
End: 2017-06-01

## 2017-06-01 ENCOUNTER — TELEPHONE (OUTPATIENT)
Dept: DERMATOLOGY | Facility: CLINIC | Age: 67
End: 2017-06-01

## 2017-06-01 DIAGNOSIS — L23.9 ALLERGIC CONTACT DERMATITIS, UNSPECIFIED TRIGGER: Primary | ICD-10-CM

## 2017-06-01 RX ORDER — PREDNISONE 20 MG/1
TABLET ORAL
Qty: 28 TABLET | Refills: 0 | Status: SHIPPED | OUTPATIENT
Start: 2017-06-01

## 2017-06-01 ASSESSMENT — PAIN SCALES - GENERAL: PAINLEVEL: NO PAIN (0)

## 2017-06-01 NOTE — MR AVS SNAPSHOT
After Visit Summary   6/1/2017    Kortney Merritt    MRN: [id number]           Patient Information     Date Of Birth          1950        Visit Information        Provider Department      6/1/2017 10:30 AM Alireza Thayer MD [facility] Dermatology        Today's Diagnoses     Allergic contact dermatitis, unspecified trigger    -  1      Care Instructions    Continue with Lotemax BID as prescribed for now.   Start Oral Prednisone, and take as prescribed for the next couple weeks.  Will see you back in 3 weeks; hope to transition to the Protopic ointment at that point as a steroid sparing agent.            Follow-ups after your visit        Follow-up notes from your care team     Return in about 3 weeks (around 6/22/2017).      Your next 10 appointments already scheduled     Jun 22, 2017 11:00 AM CDT   (Arrive by 10:45 AM)   Return Visit with Alireza Thayer MD   [facility] Dermatology ([email or web address])    [address] [place] [id number] [phone]              Who to contact     Please call your clinic at [phone] to:    Ask questions about your health    Make or cancel appointments    Discuss your medicines    Learn about your test results    Speak to your doctor   If you have compliments or concerns about an experience at your clinic, or if you wish to file a complaint, please contact [facility] Patient Relations at [phone] or email us at [email or web address]         Additional Information About Your Visit        MyChart Information     MyChart gives you secure access to your electronic health record. If you see a primary care provider, you can also send messages to your care team and make appointments. If you have questions, please call your primary care clinic.  If you do not have a primary care provider, please call [phone] and they will assist you.      [employer] is an electronic  gateway that provides easy, online access to your medical records. With [employer], you can request a clinic appointment, read your test results, renew a prescription or communicate with your care team.     To access your existing account, please contact your [facility] or call [phone] for assistance.        Care EveryWhere ID     This is your Care EveryWhere ID. This could be used by other organizations to access your [place] medical records  [id number]         Blood Pressure from Last 3 Encounters:   01/12/13 (!) 137/92    Weight from Last 3 Encounters:   01/12/13 70.3 kg (155 lb)              Today, you had the following     No orders found for display         Today's Medication Changes          These changes are accurate as of: 6/1/17 10:54 AM.  If you have any questions, ask your nurse or doctor.               Start taking these medicines.        Dose/Directions    predniSONE 20 MG tablet   Commonly known as:  DELTASONE   Used for:  Allergic contact dermatitis, unspecified trigger   Started by:  Alireza Thayer MD        Take 2 tablets (40mg) once daily x 7 days, then 1.5 tabs (30mg)  for 3 days, 1 tab (20mg) x3 days, then 1/2 tab (10mg) x 3 days.   Quantity:  28 tablet   Refills:  0            Where to get your medicines      These medications were sent to [place] [address] [place] [address]     Phone:  [phone]     predniSONE 20 MG tablet                Primary Care Provider Office Phone # Fax #    [facility] [phone]       [address] [place] [address]        Thank you!     Thank you for choosing [facility] DERMATOLOGY  for your care. Our goal is always to provide you with excellent care. Hearing back from our patients is one way we can continue to improve our services. Please take a few minutes to complete the written survey that you may receive  in the mail after your visit with us. Thank you!             Your Updated Medication List - Protect others around you: Learn how to safely use, store and throw away your medicines at www.disposemymeds.org.          This list is accurate as of: 6/1/17 10:54 AM.  Always use your most recent med list.                   Brand Name Dispense Instructions for use    autologous serum compounded ophthalmic solution          azithromycin 250 MG tablet    ZITHROMAX     One time monthly       calcium carbonate 1500 (600 CA) MG tablet    OS-YOSHI 600 mg [place]. Ca     Take 600 mg by mouth       cephALEXin 500 MG capsule    KEFLEX    45 capsule    Take 1 capsule (500 mg) by mouth 3 times daily       CVS VITAMIN D3 400 UNITS Caps   Generic drug:  Cholecalciferol          epinastine HCl 0.05 % Soln    ELESTAT         hydrocortisone 2.5 % ointment     30 g    Apply twice daily to eyelids for the next 3 weeks       hypromellose 0.3 % Gel ophthalmic gel    GENTEAL         LOTEMAX 0.5 % Oint opthalmic ointment   Generic drug:  Loteprednol Etabonate      Place 1 Application into both eyes 4 times daily       NO ACTIVE MEDICATIONS          predniSONE 20 MG tablet    DELTASONE    28 tablet    Take 2 tablets (40mg) once daily x 7 days, then 1.5 tabs (30mg)  for 3 days, 1 tab (20mg) x3 days, then 1/2 tab (10mg) x 3 days.       SYSTANE ULTRA PF OP          tacrolimus 0.1 % ointment    PROTOPIC         XIIDRA 5 % Soln   Generic drug:  Lifitegrast

## 2017-06-01 NOTE — PROGRESS NOTES
[facility] Dermatology Note       DERMATOLOGY PROBLEM LIST:   1.  Allergic contact dermatitis, bilateral eyelids starting 1 year ago, status post punctal plug placements for xerophthalmia.  Patch testing results demonstrating a doubtful reaction to methyldibromo glutaronitrile, phenoxyethanol and phenylmercuric acetate, as well as benzalkonium chloride.  She had a positive reaction to formaldehyde, her personal aloe vera product and gold.  She had a biopsy at an oculoplastic surgeon demonstrating acute spongiotic dermatitis on 05/15/2017.  Difficult management.  Present management treatment as below.   2.  Status post punctal plug removal, left 04/17, right 05/15/2017.       ENCOUNTER DATE:  06/01/2017.       CHIEF COMPLAINT:  Followup eyelid dermatitis.       HISTORY OF PRESENT ILLNESS:     Kortney Merritt is a pleasant 67-year-old woman who is well known to our Dermatology Clinic who follows up today for her eyelid dermatitis.  She notes that things have improved slightly since her last visit.  She notes that her left is now much better than her right.  She underwent right sided punctal plug removal 2 weeks ago with her ophthalmologist.  She also had 3 biopsies that were performed at that point, 1 on the left temple that demonstrated seborrheic keratosis, 1 on the left lower lid demonstrated an acute spongiotic dermatitis and 1 from the left upper lid demonstrating a verrucous keratosis.  No malignancy was noted.  She, since her last visit, has been on Lotemax 0.5% ointment twice daily.  She notes that she has had significant burning with this and, therefore, does not use it q.i.d. as initially instructed.  She has not tried increased potency topical steroids prescribed by her ophthalmologist given concern for atrophy, corneal thinning, glaucoma, cataracts, etc.  Notably, she is continuing with Systane Ultra eyedrops but nothing else in this area.  She has been using Mary lotion to the face and  cheeks without any rash at that site.  No application topically to the eyelids.  Still no gold use or exposure.  She has been checking all of her labels well for formaldehyde and formaldehyde releases and all those that she came back positive for on the patch testing.  Notable previous use test x3 days under occlusion on the forearm with the Systane Ultra demonstrating no reaction.  She has recently ordered eyedrops online from [place] that she has not yet started using.  Overall, she is having ongoing swelling, itching, redness and mattering on the bilateral eyes.  She has not had any additional IPL on report since her last visit,  No other concerns today or rash.  She is quite frustrated and hopes to identify what might be triggering this presently.  She has not been treated with any PO corticorsteroids to date.       PAST MEDICAL HISTORY:    Past Medical History    History reviewed. No pertinent past medical history.        PAST SURGICAL HISTORY:    Past Surgical History    History reviewed. No pertinent surgical history.        FAMILY HISTORY:    Family History          Family History   Problem Relation Age of Onset     Melanoma No family hx of              SOCIAL HISTORY:          Social History    Substance Use Topics      Smoking status: Former Smoker      Smokeless tobacco: Never Used      Alcohol use 0.5 oz/week       1 Glasses of wine per week             Current Outpatient Prescriptions   Medication     Loteprednol Etabonate (LOTEMAX) 0.5 % OINT opthalmic ointment     predniSONE (DELTASONE) 20 MG tablet     calcium carbonate (OS-YOSHI 600 MG [place]. CA) 1500 (600 CA) MG tablet     Cholecalciferol (CVS VITAMIN D3) 400 UNITS CAPS     cephALEXin (KEFLEX) 500 MG capsule     hydrocortisone 2.5 % ointment     autologous serum compounded ophthalmic solution     epinastine HCl (ELESTAT) 0.05 % SOLN     hypromellose (GENTEAL) ophthalmic gel 0.3%     Polyethyl Glycol-Propyl Glycol (SYSTANE ULTRA PF OP)     tacrolimus  (PROTOPIC) 0.1 % ointment     Lifitegrast (XIIDRA) 5 % SOLN     azithromycin (ZITHROMAX) 250 MG tablet     NO ACTIVE MEDICATIONS      No current facility-administered medications for this visit.             Allergies   Allergen Reactions     Formaldehyde Other (See Comments)       Positive (+) skin patch test     Methyldibromoglutaronitrile Other (See Comments)       Doubtful Positive (+) Skin Patch Test     Other  [No Clinical Screening - See Comments] Other (See Comments)       Gold; Positive (+) skin patch test     Phenoxyethanol Other (See Comments)       Doubtful Positive (+) Skin Patch Test     Phenylmercuric Acetate Other (See Comments)       Doubtful Positive (+) Skin Patch Test     Sulfa Drugs Hives, Rash and Itching             REVIEW OF SYSTEMS:  A 4-point review of systems negative beyond stated above in HPI.       PHYSICAL EXAMINATION:     GENERAL:  A well-appearing woman in no acute distress, appears stated age, cooperative with the exam.     SKIN:  A limited skin examination today including the face, neck, upper chest, arms and hands was performed today.  Notably, she has ongoing edema of the bilateral upper and lower eyelids.  There is overlying acute to subacute appearing pink, very minimally lichenified plaques over this distribution.  There is increased watering and mattering on the bilateral medial canthi.  The left side is quite less edematous and erythematous than there was noted previously and less so than the right side.  She has no other rash noted in the distribution previously.  Healing well at the previous biopsy sites without concerns and mild hemorrhagic crusting overlying.       ASSESSMENT AND PLAN:   1. Allergic contact dermatitis/blepharitis.    Detailed history noted in previous encounters.  In brief, she has had limited improvement, as well as punctal plug removal, as well as increased potency of topical steroids using Lotemax 0.5% b.i.d. ointment.  She still has significant  involvement bilaterally with ongoing symptoms that she is quite frustrated with.  She recently had biopsies demonstrating acute spongiotic dermatitis in the left lower eyelid.  This is consistent with our presumed diagnosis of contact dermatitis.  Unfortunately, despite avoiding those topical medications containing the agents that she reacted to in the patch testing ( including formaldehyde and formaldehyde releasers, methyldibromo glutaronitrile, gold, phenoxyethanol and phenylmercuric acetate) she has had ongoing rash.  She does note that it is the best that it has been in the last year or so.  She is continuing with the Systane Ultra eyedrops.  She is interested in transitioning to another topical moisturizing drop at this time given concern that this may be continuing to worsen things.  We feel like this is a fairly low likelihood culprit but this seems like a reasonable approach as long as she is avoiding any agents that are known allergens to her.       -At this point, will attempt to decrease the inflammation with a systemic corticosteroid taper.  Will start 40 mg prednisone for 7 days, then decrease by 10 mg every 3 days for a total course of 16 days.    -We will see her back in 3 weeks to reassess  -Will have her continue with either the Lotemax 0.5% ointment b.i.d. or hydrocortisone 2.5% ointment, whichever is better tolerated for the next 3 weeks.    -At that point, we would hope to transition back to something like Protopic as a steroid-sparing agent given chronicity of her use.  We do feel that decreasing the inflammation will assist in her tolerance of this medication.    -We are optimistic that with removal of the punctal plugs and possible decreased exposure to any unidentified aeroallergens, we may see improvement of her disease control.   Will continue her on azithromycin, a 5-day pack, once monthly as previously prescribed by her ophthalmologist.   -All questions were answered to her apparent  satisfaction.  Counseling on the medications were provided today in detail as well as the risks, benefits and side effects.           Follow up in 3 weeks.       Dr. Janee Hand staffed the patient.       Staff Involved:   Dictated by Resident (Alireza Thayer MD)/Staff(as above)      This note was dictated and edited by MD Alireza Valera MD  PGY2 Dermatology  [phone]    .I, Janee Hand MD, saw this patient with the resident and agree with the resident s findings and plan of care as documented in the resident s note.    cc:   Moiz Peters MD   [facility]   [address] [place] [address]       Steve Barton MD   [facility]   [address] [place] [address]      cc:   Moiz Peters MD   [facility]   [address] [place] [address]       Steve Barton MD   [facility]   [address] [place] [address]     cc:   Moiz Peters MD   [facility]   [address] [place] [address]       Steve Barton MD   [facility]   [address] [place] [address]

## 2017-06-01 NOTE — PATIENT INSTRUCTIONS
Continue with Lotemax BID as prescribed for now.   Start Oral Prednisone, and take as prescribed for the next couple weeks.  Will see you back in 3 weeks; hope to transition to the Protopic ointment at that point as a steroid sparing agent.

## 2017-06-01 NOTE — LETTER
6/1/2017       RE: Kortney Merritt  [address]  [facility] [id number]     Dear Colleague,    Thank you for referring your patient, Kortney Merritt, to the [facility] DERMATOLOGY at [facility]. Please see a copy of my visit note below.    [facility] Dermatology Note       DERMATOLOGY PROBLEM LIST:   1.  Allergic contact dermatitis, bilateral eyelids starting 1 year ago, status post punctal plug placements for xerophthalmia.  Patch testing results demonstrating a doubtful reaction to methyldibromo glutaronitrile, phenoxyethanol and phenylmercuric acetate, as well as benzalkonium chloride.  She had a positive reaction to formaldehyde, her personal aloe vera product and gold.  She had a biopsy at an oculoplastic surgeon demonstrating acute spongiotic dermatitis on 05/15/2017.  Difficult management.  Present management treatment as below.   2.  Status post punctal plug removal, left 04/17, right 05/15/2017.       ENCOUNTER DATE:  06/01/2017.       CHIEF COMPLAINT:  Followup eyelid dermatitis.       HISTORY OF PRESENT ILLNESS:     Kortney Merritt is a pleasant 67-year-old woman who is well known to our Dermatology Clinic who follows up today for her eyelid dermatitis.  She notes that things have improved slightly since her last visit.  She notes that her left is now much better than her right.  She underwent right sided punctal plug removal 2 weeks ago with her ophthalmologist.  She also had 3 biopsies that were performed at that point, 1 on the left temple that demonstrated seborrheic keratosis, 1 on the left lower lid demonstrated an acute spongiotic dermatitis and 1 from the left upper lid demonstrating a verrucous keratosis.  No malignancy was noted.  She, since her last visit, has been on Lotemax 0.5% ointment twice daily.  She notes that she has had significant burning with this and, therefore, does not use it q.i.d. as initially instructed.  She has not  tried increased potency topical steroids prescribed by her ophthalmologist given concern for atrophy, corneal thinning, glaucoma, cataracts, etc.  Notably, she is continuing with Systane Ultra eyedrops but nothing else in this area.  She has been using Mary lotion to the face and cheeks without any rash at that site.  No application topically to the eyelids.  Still no gold use or exposure.  She has been checking all of her labels well for formaldehyde and formaldehyde releases and all those that she came back positive for on the patch testing.  Notable previous use test x3 days under occlusion on the forearm with the Systane Ultra demonstrating no reaction.  She has recently ordered eyedrops online from [place] that she has not yet started using.  Overall, she is having ongoing swelling, itching, redness and mattering on the bilateral eyes.  She has not had any additional IPL on report since her last visit,  No other concerns today or rash.  She is quite frustrated and hopes to identify what might be triggering this presently.  She has not been treated with any PO corticorsteroids to date.       PAST MEDICAL HISTORY:    Past Medical History    History reviewed. No pertinent past medical history.        PAST SURGICAL HISTORY:    Past Surgical History    History reviewed. No pertinent surgical history.        FAMILY HISTORY:    Family History          Family History   Problem Relation Age of Onset     Melanoma No family hx of              SOCIAL HISTORY:          Social History    Substance Use Topics      Smoking status: Former Smoker      Smokeless tobacco: Never Used      Alcohol use 0.5 oz/week       1 Glasses of wine per week             Current Outpatient Prescriptions   Medication     Loteprednol Etabonate (LOTEMAX) 0.5 % OINT opthalmic ointment     predniSONE (DELTASONE) 20 MG tablet     calcium carbonate (OS-YOSHI 600 MG [place]. CA) 1500 (600 CA) MG tablet     Cholecalciferol (CVS VITAMIN D3) 400 UNITS CAPS      cephALEXin (KEFLEX) 500 MG capsule     hydrocortisone 2.5 % ointment     autologous serum compounded ophthalmic solution     epinastine HCl (ELESTAT) 0.05 % SOLN     hypromellose (GENTEAL) ophthalmic gel 0.3%     Polyethyl Glycol-Propyl Glycol (SYSTANE ULTRA PF OP)     tacrolimus (PROTOPIC) 0.1 % ointment     Lifitegrast (XIIDRA) 5 % SOLN     azithromycin (ZITHROMAX) 250 MG tablet     NO ACTIVE MEDICATIONS      No current facility-administered medications for this visit.             Allergies   Allergen Reactions     Formaldehyde Other (See Comments)       Positive (+) skin patch test     Methyldibromoglutaronitrile Other (See Comments)       Doubtful Positive (+) Skin Patch Test     Other  [No Clinical Screening - See Comments] Other (See Comments)       Gold; Positive (+) skin patch test     Phenoxyethanol Other (See Comments)       Doubtful Positive (+) Skin Patch Test     Phenylmercuric Acetate Other (See Comments)       Doubtful Positive (+) Skin Patch Test     Sulfa Drugs Hives, Rash and Itching             REVIEW OF SYSTEMS:  A 4-point review of systems negative beyond stated above in HPI.       PHYSICAL EXAMINATION:     GENERAL:  A well-appearing woman in no acute distress, appears stated age, cooperative with the exam.     SKIN:  A limited skin examination today including the face, neck, upper chest, arms and hands was performed today.  Notably, she has ongoing edema of the bilateral upper and lower eyelids.  There is overlying acute to subacute appearing pink, very minimally lichenified plaques over this distribution.  There is increased watering and mattering on the bilateral medial canthi.  The left side is quite less edematous and erythematous than there was noted previously and less so than the right side.  She has no other rash noted in the distribution previously.  Healing well at the previous biopsy sites without concerns and mild hemorrhagic crusting overlying.       ASSESSMENT AND PLAN:   1.  Allergic contact dermatitis/blepharitis.    Detailed history noted in previous encounters.  In brief, she has had limited improvement, as well as punctal plug removal, as well as increased potency of topical steroids using Lotemax 0.5% b.i.d. ointment.  She still has significant involvement bilaterally with ongoing symptoms that she is quite frustrated with.  She recently had biopsies demonstrating acute spongiotic dermatitis in the left lower eyelid.  This is consistent with our presumed diagnosis of contact dermatitis.  Unfortunately, despite avoiding those topical medications containing the agents that she reacted to in the patch testing ( including formaldehyde and formaldehyde releasers, methyldibromo glutaronitrile, gold, phenoxyethanol and phenylmercuric acetate) she has had ongoing rash.  She does note that it is the best that it has been in the last year or so.  She is continuing with the Systane Ultra eyedrops.  She is interested in transitioning to another topical moisturizing drop at this time given concern that this may be continuing to worsen things.  We feel like this is a fairly low likelihood culprit but this seems like a reasonable approach as long as she is avoiding any agents that are known allergens to her.       -At this point, will attempt to decrease the inflammation with a systemic corticosteroid taper.  Will start 40 mg prednisone for 7 days, then decrease by 10 mg every 3 days for a total course of 16 days.    -We will see her back in 3 weeks to reassess  -Will have her continue with either the Lotemax 0.5% ointment b.i.d. or hydrocortisone 2.5% ointment, whichever is better tolerated for the next 3 weeks.    -At that point, we would hope to transition back to something like Protopic as a steroid-sparing agent given chronicity of her use.  We do feel that decreasing the inflammation will assist in her tolerance of this medication.    -We are optimistic that with removal of the punctal  plugs and possible decreased exposure to any unidentified aeroallergens, we may see improvement of her disease control.   Will continue her on azithromycin, a 5-day pack, once monthly as previously prescribed by her ophthalmologist.   -All questions were answered to her apparent satisfaction.  Counseling on the medications were provided today in detail as well as the risks, benefits and side effects.         Follow up in 3 weeks.       Dr. Janee Hand staffed the patient.       Staff Involved:   Dictated by Resident (Alireza Thayer MD)/Staff(as above)      This note was dictated and edited by MD Alireza Valera MD  PGY2 Dermatology  [phone]    I, Janee Hand MD, saw this patient with the resident and agree with the resident s findings and plan of care as documented in the resident s note.    cc:   Moiz Peters MD   [facility]   [address] [place] [address]       Steve Barton MD   [facility]   [address] [place] [id number]

## 2017-06-01 NOTE — NURSING NOTE
Chief Complaint   Patient presents with     Derm Problem     Kortney is here today to have her contact dermatitis rechecked. She states that things are still the same. She states that her eye mediation was changed by her opthamologist and that seems to be helping a little.      Alexandria Salguero, [facility]

## 2017-06-01 NOTE — TELEPHONE ENCOUNTER
Pharmacy calling informing me that prednisone is not covered until MD calls and communicates  Diagnosis for the prednisone.    Medicare wants to differentiate that it's not a medicare part B therapy.   They want to make sure the Rx is not for Cancer related reasons.     MD needs to call and inform them what the prednisone is for.      Please call [phone]

## 2017-06-02 NOTE — TELEPHONE ENCOUNTER
Prior Authorization Retail Medication Request  Medication/Dose: Medicare coverage   Diagnosis and ICD code: Allergic contact dermatitis, unspecified trigger (L23.9)  New/Renewal/Insurance Change [place]: New - using to treat allergic dermatitis flare  Previously Tried and Failed Therapies: topical steroids like Lotemax 0.5% ointment, hydrocortisone 2.5% ointment, Protopic    Insurance ID (if provided): [id number]  Insurance Phone (if provided): [phone]    *Pt needs for current flare, had patch testing, please do a B versus D or [place] to gain coverage

## 2017-06-02 NOTE — TELEPHONE ENCOUNTER
[facility] Prior Authorization Team   Phone: [phone]  Fax: [phone]    [place] Initiation    Medication: prednisone 20mg tablets  Insurance Company: [employer] - Phone [phone] Fax [phone]  Pharmacy Filling the Rx: [id number] - [place], [place] - [address]  Filling Pharmacy Phone: [phone]  Filling Pharmacy Fax: [phone]  Start Date: 6/2/2017

## 2017-06-02 NOTE — PROGRESS NOTES
[facility] Dermatology Note      DERMATOLOGY PROBLEM LIST:   1.  Allergic contact dermatitis, bilateral eyelids starting 1 year ago, status post punctal plug placements for xerophthalmia.  Patch testing results demonstrating a doubtful reaction to methyldibromo glutaronitrile, phenoxyethanol and phenylmercuric acetate, as well as benzalkonium chloride.  She had a positive reaction to formaldehyde, her personal aloe vera product and gold.  She had a biopsy at an oculoplastic surgeon demonstrating acute spongiotic dermatitis on 05/15/2017.  Difficult management.  Present management treatment as below.   2.  Status post punctal plug removal, left 04/17, right 05/15/2017.      ENCOUNTER DATE:  06/01/2017.      CHIEF COMPLAINT:  Followup eyelid dermatitis.      HISTORY OF PRESENT ILLNESS:     Kortney Merritt is a pleasant 67-year-old woman who is well known to our Dermatology Clinic who follows up today for her eyelid dermatitis.  She notes that things have improved slightly since her last visit.  She notes that her left is now much better than her right.  She underwent right sided punctal plug removal 2 weeks ago with her ophthalmologist.  She also had 3 biopsies that were performed at that point, 1 on the left temple that demonstrated seborrheic keratosis, 1 on the left lower lid demonstrated an acute spongiotic dermatitis and 1 from the left upper lid demonstrating a verrucous keratosis.  No malignancy was noted.  She, since her last visit, has been on Lotemax 0.5% ointment twice daily.  She notes that she has had significant burning with this and, therefore, does not use it q.i.d. as initially instructed.  She has not tried increased potency topical steroids prescribed by her ophthalmologist given concern for atrophy, corneal thinning, glaucoma, cataracts, etc.  Notably, she is continuing with Systane Ultra eyedrops but nothing else in this area.  She has been using Mary lotion to the face and cheeks  without any rash at that site.  No application topically to the eyelids.  Still no gold use or exposure.  She has been checking all of her labels well for formaldehyde and formaldehyde releases and all those that she came back positive for on the patch testing.  Notable previous use test x3 days under occlusion on the forearm with the Systane Ultra demonstrating no reaction.  She has recently ordered eyedrops online from [place] that she has not yet started using.  Overall, she is having ongoing swelling, itching, redness and mattering on the bilateral eyes.  She has not had any additional IPL on report since her last visit,  No other concerns today or rash.  She is quite frustrated and hopes to identify what might be triggering this presently.  She has not been treated with any PO corticorsteroids to date.      PAST MEDICAL HISTORY: History reviewed. No pertinent past medical history.    PAST SURGICAL HISTORY: History reviewed. No pertinent surgical history.    FAMILY HISTORY:   Family History   Problem Relation Age of Onset     Melanoma No family hx of        SOCIAL HISTORY:   Social History   Substance Use Topics     Smoking status: Former Smoker     Smokeless tobacco: Never Used     Alcohol use 0.5 oz/week     1 Glasses of wine per week       Current Outpatient Prescriptions   Medication     Loteprednol Etabonate (LOTEMAX) 0.5 % OINT opthalmic ointment     predniSONE (DELTASONE) 20 MG tablet     calcium carbonate (OS-YOSHI 600 MG [place] CA) 1500 (600 CA) MG tablet     Cholecalciferol (CVS VITAMIN D3) 400 UNITS CAPS     cephALEXin (KEFLEX) 500 MG capsule     hydrocortisone 2.5 % ointment     autologous serum compounded ophthalmic solution     epinastine HCl (ELESTAT) 0.05 % SOLN     hypromellose (GENTEAL) ophthalmic gel 0.3%     Polyethyl Glycol-Propyl Glycol (SYSTANE ULTRA PF OP)     tacrolimus (PROTOPIC) 0.1 % ointment     Lifitegrast (XIIDRA) 5 % SOLN     azithromycin (ZITHROMAX) 250 MG tablet     NO ACTIVE  MEDICATIONS     No current facility-administered medications for this visit.      Allergies   Allergen Reactions     Formaldehyde Other (See Comments)     Positive (+) skin patch test     Methyldibromoglutaronitrile Other (See Comments)     Doubtful Positive (+) Skin Patch Test     Other  [No Clinical Screening - See Comments] Other (See Comments)     Gold; Positive (+) skin patch test     Phenoxyethanol Other (See Comments)     Doubtful Positive (+) Skin Patch Test     Phenylmercuric Acetate Other (See Comments)     Doubtful Positive (+) Skin Patch Test     Sulfa Drugs Hives, Rash and Itching          REVIEW OF SYSTEMS:  A 4-point review of systems negative beyond stated above in HPI.      PHYSICAL EXAMINATION:     GENERAL:  A well-appearing woman in no acute distress, appears stated age, cooperative with the exam.     SKIN:  A limited skin examination today including the face, neck, upper chest, arms and hands was performed today.  Notably, she has ongoing edema of the bilateral upper and lower eyelids.  There is overlying acute to subacute appearing pink, very minimally lichenified plaques over this distribution.  There is increased watering and mattering on the bilateral medial canthi.  The left side is quite less edematous and erythematous than there was noted previously and less so than the right side.  She has no other rash noted in the distribution previously.  Healing well at the previous biopsy sites without concerns and mild hemorrhagic crusting overlying.      ASSESSMENT AND PLAN:   1. Allergic contact dermatitis/blepharitis.    Detailed history noted in previous encounters.  In brief, she has had limited improvement, as well as punctal plug removal, as well as increased potency of topical steroids using Lotemax 0.5% b.i.d. ointment.  She still has significant involvement bilaterally with ongoing symptoms that she is quite frustrated with.  She recently had biopsies demonstrating acute spongiotic  dermatitis in the left lower eyelid.  This is consistent with our presumed diagnosis of contact dermatitis.  Unfortunately, despite avoiding those topical medications containing the agents that she reacted to in the patch testing ( including formaldehyde and formaldehyde releasers, methyldibromo glutaronitrile, gold, phenoxyethanol and phenylmercuric acetate) she has had ongoing rash.  She does note that it is the best that it has been in the last year or so.  She is continuing with the Systane Ultra eyedrops.  She is interested in transitioning to another topical moisturizing drop at this time given concern that this may be continuing to worsen things.  We feel like this is a fairly low likelihood culprit but this seems like a reasonable approach as long as she is avoiding any agents that are known allergens to her.      -At this point, will attempt to decrease the inflammation with a systemic corticosteroid taper.  Will start 40 mg prednisone for 7 days, then decrease by 10 mg every 3 days for a total course of 16 days.    -We will see her back in 3 weeks to reassess  -Will have her continue with either the Lotemax 0.5% ointment b.i.d. or hydrocortisone 2.5% ointment, whichever is better tolerated for the next 3 weeks.    -At that point, we would hope to transition back to something like Protopic as a steroid-sparing agent given chronicity of her use.  We do feel that decreasing the inflammation will assist in her tolerance of this medication.    -We are optimistic that with removal of the punctal plugs and possible decreased exposure to any unidentified aeroallergens, we may see improvement of her disease control.   Will continue her on azithromycin, a 5-day pack, once monthly as previously prescribed by her ophthalmologist.   -All questions were answered to her apparent satisfaction.  Counseling on the medications were provided today in detail as well as the risks, benefits and side effects.         Follow up in  3 weeks.      Dr. Janee Hand staffed the patient.      Staff Involved:   Dictated by Resident (Alireza Thayer MD)/Staff(as above)     This note was dictated and edited by MD Alireza Valera MD  PGY2 Dermatology  [phone]     cc:   Moiz Peters MD   [facility]   [address] [address]       Steve Barton MD   [facility]   [address] [place] [address]

## 2017-06-07 NOTE — TELEPHONE ENCOUNTER
Received a letter from [facility] saying that the prednisone has been approved. Called and spoke to Kortney Doyle and informed her.  Letter placed in scanning.    Marli Rodriguez LPN

## 2017-06-13 ENCOUNTER — TELEPHONE (OUTPATIENT)
Dept: OPHTHALMOLOGY | Facility: CLINIC | Age: 67
End: 2017-06-13

## 2017-06-13 NOTE — TELEPHONE ENCOUNTER
----- Message from Rosio Panchal sent at 6/12/2017  3:06 PM CDT -----  Regarding: Pt question- New  Contact: [phone]  Pt questioning if we offer testing and services for the Wilson scleral lens.    Pt can be reached at number above.    Thanks!- lel    Please DO NOT send this message and/or reply back to sender.  Call Center Representatives DO NOT respond to messages.

## 2017-06-13 NOTE — TELEPHONE ENCOUNTER
Reviewed Dr. Gomez would be able to fit/review/prescribe if applicable boston scleral lenses  Pt may make appt with dr. Dr. Gomez  Pt has h/o conjunctivoplasty, meibomian gland dysfunction, dryness,blepharitis  Pt seemed pleased with information  Pt states will call for appt  Jayro Viera RN 9:31 AM 06/13/17

## 2017-06-22 ENCOUNTER — OFFICE VISIT (OUTPATIENT)
Dept: DERMATOLOGY | Facility: CLINIC | Age: 67
End: 2017-06-22

## 2017-06-22 DIAGNOSIS — L23.89 ALLERGIC CONTACT DERMATITIS DUE TO OTHER AGENTS: Primary | ICD-10-CM

## 2017-06-22 ASSESSMENT — PAIN SCALES - GENERAL: PAINLEVEL: NO PAIN (0)

## 2017-06-22 NOTE — NURSING NOTE
Dermatology Rooming Note    Kortney Merritt's goals for this visit include:   Chief Complaint   Patient presents with     Derm Problem     Kortney is here for a dermatitis follow up, Kortney notes improvement w ith the use of prednisone.     Sushma Sandhu LPN

## 2017-06-22 NOTE — PATIENT INSTRUCTIONS
Continue with twice daily use of the protopic. Okay with lotemax briefly with flares.     Call if flaring. We can always consider adding prednisone and other medications at that point.

## 2017-06-22 NOTE — PROGRESS NOTES
[facility] Dermatology Note       DERMATOLOGY PROBLEM LIST:   1.  Allergic contact dermatitis, bilateral eyelids starting >1 year ago, status post punctal plug placements for xerophthalmia.  Patch testing results demonstrating a doubtful reaction to methyldibromo glutaronitrile, phenoxyethanol and phenylmercuric acetate, as well as benzalkonium chloride.  She had a positive reaction to formaldehyde, her personal aloe vera product and gold.  She had a biopsy at an oculoplastic surgeon demonstrating acute spongiotic dermatitis on 05/15/2017.  Difficult management.  Present management/ treatment as below. Largely resolved s/p PO pred course.   2.  Status post punctal plug removal, left 04/17, right 05/15/2017.      ENCOUNTER DATE:  06/22/2017.      CHIEF COMPLAINT:  Followup eyelid dermatitis.      HISTORY OF PRESENT ILLNESS:     Ms. Merritt is a pleasant 67-year-old woman who is well known to Dermatology Clinic presents today for followup of her eyelid dermatitis.  Since she was last seen 04/27/2017, she has had tremendous improvement in her symptomatology over the eyes.  She is status post a 16-day prednisone taper, which led to complete resolution of her ocular and periocular findings.  She last stopped her final dose of 10 mg prednisone on Sunday this week and has not had any recurrence since that time.  She has noted some mild increase in pruritus but no increased erythema, eye drainage, rash, among others.  She notes that her eye irritation and pain symptoms have improved significantly.  She did try transitioning over to doxycycline in place of the azithromycin for her ocular rosacea but has not noted any significant difference since doing so.  The rash had already cleared prior to this transition.  She has continued to use Systane eyedrops regularly without concern.  She did try alternative drops over-the-counter that she purchased online but these did not seem to lead to resolution of her  ocular symptoms.  She is in today with a pathology report outlining the same findings that were noted at the last examination, notably subacute spongiotic dermatitis from periocular skin on 05/15/2017.  She has transitioned to Protopic per her ophthalmologist and has been using this approximately every day.  She has occasionally been using Lotemax 0.5% ointment as well.  She has been trying to use the Protopic more regularly.  She does have some questions today about what the plan is going forward, if this is going to recur, among others.  She is otherwise quite pleased without any rash elsewhere.      PAST MEDICAL HISTORY:     Reviewed, noncontributory.      PAST MEDICAL HISTORY: No past medical history on file.    PAST SURGICAL HISTORY: No past surgical history on file.    FAMILY HISTORY:   Family History   Problem Relation Age of Onset     Melanoma No family hx of        SOCIAL HISTORY:   Social History   Substance Use Topics     Smoking status: Former Smoker     Smokeless tobacco: Never Used     Alcohol use 0.5 oz/week     1 Glasses of wine per week       Current Outpatient Prescriptions   Medication     Loteprednol Etabonate (LOTEMAX) 0.5 % OINT opthalmic ointment     predniSONE (DELTASONE) 20 MG tablet     calcium carbonate (OS-YOSHI 600 MG [place]. CA) 1500 (600 CA) MG tablet     Cholecalciferol (CVS VITAMIN D3) 400 UNITS CAPS     cephALEXin (KEFLEX) 500 MG capsule     autologous serum compounded ophthalmic solution     epinastine HCl (ELESTAT) 0.05 % SOLN     hypromellose (GENTEAL) ophthalmic gel 0.3%     Polyethyl Glycol-Propyl Glycol (SYSTANE ULTRA PF OP)     tacrolimus (PROTOPIC) 0.1 % ointment     Lifitegrast (XIIDRA) 5 % SOLN     azithromycin (ZITHROMAX) 250 MG tablet     NO ACTIVE MEDICATIONS     hydrocortisone 2.5 % ointment     No current facility-administered medications for this visit.        Allergies   Allergen Reactions     Formaldehyde Other (See Comments)     Positive (+) skin patch test      Methyldibromoglutaronitrile Other (See Comments)     Doubtful Positive (+) Skin Patch Test     Other  [No Clinical Screening - See Comments] Other (See Comments)     Gold; Positive (+) skin patch test     Phenoxyethanol Other (See Comments)     Doubtful Positive (+) Skin Patch Test     Phenylmercuric Acetate Other (See Comments)     Doubtful Positive (+) Skin Patch Test     Sulfa Drugs Hives, Rash and Itching          REVIEW OF SYSTEMS:     A 4-point review of systems negative beyond that stated in above HPI.      PHYSICAL EXAMINATION:   GENERAL:  A well-appearing woman in no acute distress, appearance stated age, cooperative with examination.   SKIN:  A limited examination today including the face, neck, hands and distal arms was performed.  She notably has very mild increased tear formation overlying the conjunctivae and less so over the medial canthi.  She does have some very minimal nonspecific swelling over the periocular skin without any epidermal change and without any recurrent rash.  Her rash is largely clear entirely and we are quite pleased with this response.  Healing sites from previous biopsies without any concerning findings today.  No concerning lesions.  Essentially no erythema, crusting, mattering or scale noted today.  No lesions elsewhere.      ASSESSMENT AND PLAN:   1.  Allergic contact dermatitis/blepharitis.    Detailed history noted in previous encounters.  Notably, since her last visit, she has had near complete resolution of her periocular rash.  This is largely attributed to a recent steroid burst/taper but fortunately the patient has not had any recurrence since she has been off of the prednisone (since Sunday of this week).  We are quite pleased with her overall response.  She has been able to transition over to Protopic well without any side effects of this medication.  She is continuing to avoid topical medication containing agents that she has been known to react to in the past via  patch testing, include formaldehyde, formaldehyde releases, methyldibromo glutaronitrile, gold, phenoxyethanol and phenylmercuric acetate.    We did discuss the possibility of her eruption returning.  It is unclear whether or not this is going to recur or if her remission may be sustained.  We are optimistic for the latter.  She asked some questions about what her next step might be if the rashes recur.  It would be reasonable to restart her on a low-dose short course of prednisone at 20 mg per day with consideration of transitioning to a steroid-sparing agent long term if she continues to require systemic treatment like cyclosporine if necessary.  We will cross this bridge in the future going forward.    -At this point, we will have her continue with twice daily Protopic to periocular skin.    -She will continue on azithromycin for ocular rosacea 5 days out of the month as recommended by her ophthalmologist.  -She can continue to use Lotemax 0.5% b.i.d. ointment for up to 1-2 weeks with flares of her ocular findings.   - If things become poorly controlled, she should call in and we will be happy to reevaluate and/or prescribe a short course of oral prednisone at dosage as noted above to obtain better control.    -She will continue to avoid any topical medications and products that contain known contact allergens.   - Continue gentle skin care.   - Continue followup as recommended indicated by her ophthalmologist.     Followup in 3 months or sooner pending response to above treatment.  If she is completely clear in 3 months, there is no need for her to continue to follow up with us regularly.          Dr. Janee Hand staffed the patient.      Staff Involved:   Dictated by Resident (Alireza Thayer MD)/Staff(as above)     This note was dictated and edited by MD Alireza Valera MD  PGY2 Dermatology  [phone]   .I, Janee Hand MD, saw this patient with the resident and agree with the  resident s findings and plan of care as documented in the resident s note.       cc:   Moiz Peters MD    [facility]   [address] , [facility] [address]   [facility] [address]      Steve Barton MD   [facility]   [address] [place] [id number]

## 2017-06-22 NOTE — MR AVS SNAPSHOT
After Visit Summary   6/22/2017    Kortney Merritt    MRN: [id number]           Patient Information     Date Of Birth          1950        Visit Information        Provider Department      6/22/2017 11:00 AM Alireza Thayer MD [facility] Dermatology        Today's Diagnoses     Allergic contact dermatitis due to other agents    -  1      Care Instructions    Continue with twice daily use of the protopic. Okay with lotemax briefly with flares.     Call if flaring. We can always consider adding prednisone and other medications at that point.           Follow-ups after your visit        Follow-up notes from your care team     Return in about 3 months (around 9/22/2017).      Your next 10 appointments already scheduled     Sep 28, 2017 10:45 AM CDT   (Arrive by 10:30 AM)   Return Visit with Alireza Thayer MD   [facility] Dermatology ([facility])    [address] [place] [id number] [phone]              Who to contact     Please call your clinic at [phone] to:    Ask questions about your health    Make or cancel appointments    Discuss your medicines    Learn about your test results    Speak to your doctor   If you have compliments or concerns about an experience at your clinic, or if you wish to file a complaint, please contact [facility] Patient Relations at [phone] or email us at [email or web address]         Additional Information About Your Visit        MyChart Information     [employer] gives you secure access to your electronic health record. If you see a primary care provider, you can also send messages to your care team and make appointments. If you have questions, please call your primary care clinic.  If you do not have a primary care provider, please call [phone] and they will assist you.      [employer] is an electronic gateway that provides easy, online access to your medical  records. With [employer], you can request a clinic appointment, read your test results, renew a prescription or communicate with your care team.     To access your existing account, please contact your [facility] or call [phone] for assistance.        Care EveryWhere ID     This is your Care EveryWhere ID. This could be used by other organizations to access your [place] medical records  [id number]         Blood Pressure from Last 3 Encounters:   01/12/13 (!) 137/92    Weight from Last 3 Encounters:   01/12/13 70.3 kg (155 lb)              Today, you had the following     No orders found for display       Primary Care Provider Office Phone # Fax #    [facility] [phone]       [address] [place], [place] [id number]        Equal Access to Services     KIRSTEN SIMON : Hadii aad ku hadasho Soomaali, waaxda luqadaha, qaybta kaalmada adeegyada, maico flores . So [facility] [phone].    ATENCIÓN: Si habla español, tiene a hopkins disposición servicios gratuitos de asistencia lingüística. Llame al [phone].    We comply with applicable federal civil rights laws and [place] laws. We do not discriminate on the basis of race, color, national origin, age, disability sex, sexual orientation or gender identity.            Thank you!     Thank you for choosing [facility] DERMATOLOGY  for your care. Our goal is always to provide you with excellent care. Hearing back from our patients is one way we can continue to improve our services. Please take a few minutes to complete the written survey that you may receive in the mail after your visit with us. Thank you!             Your Updated Medication List - Protect others around you: Learn how to safely use, store and throw away your medicines at www.disposemymeds.org.          This list is accurate as of: 6/22/17 11:09 AM.  Always use your most recent med list.                   Brand  Name Dispense Instructions for use Diagnosis    autologous serum compounded ophthalmic solution           azithromycin 250 MG tablet    ZITHROMAX     One time monthly        calcium carbonate 1500 (600 CA) MG tablet    OS-YOSHI 600 mg [place]. Ca     Take 600 mg by mouth        cephALEXin 500 MG capsule    KEFLEX    45 capsule    Take 1 capsule (500 mg) by mouth 3 times daily    Impetigo       CVS VITAMIN D3 400 UNITS Caps   Generic drug:  Cholecalciferol           epinastine HCl 0.05 % Soln    ELESTAT          hydrocortisone 2.5 % ointment     30 g    Apply twice daily to eyelids for the next 3 weeks    Allergic contact dermatitis, unspecified trigger       hypromellose 0.3 % Gel ophthalmic gel    GENTEAL          LOTEMAX 0.5 % Oint opthalmic ointment   Generic drug:  Loteprednol Etabonate      Place 1 Application into both eyes 4 times daily        NO ACTIVE MEDICATIONS           predniSONE 20 MG tablet    DELTASONE    28 tablet    Take 2 tablets (40mg) once daily x 7 days, then 1.5 tabs (30mg)  for 3 days, 1 tab (20mg) x3 days, then 1/2 tab (10mg) x 3 days.    Allergic contact dermatitis, unspecified trigger       SYSTANE ULTRA PF OP           tacrolimus 0.1 % ointment    PROTOPIC          XIIDRA 5 % Soln   Generic drug:  Lifitegrast

## 2017-06-22 NOTE — LETTER
6/22/2017       RE: Kortney Merritt  [address]  [facility] [id number]     Dear Colleague,    Thank you for referring your patient, Kortney Merritt, to the [facility] at [facility]. Please see a copy of my visit note below.    [facility] Dermatology Note       DERMATOLOGY PROBLEM LIST:   1.  Allergic contact dermatitis, bilateral eyelids starting >1 year ago, status post punctal plug placements for xerophthalmia.  Patch testing results demonstrating a doubtful reaction to methyldibromo glutaronitrile, phenoxyethanol and phenylmercuric acetate, as well as benzalkonium chloride.  She had a positive reaction to formaldehyde, her personal aloe vera product and gold.  She had a biopsy at an oculoplastic surgeon demonstrating acute spongiotic dermatitis on 05/15/2017.  Difficult management.  Present management/ treatment as below. Largely resolved s/p PO pred course.   2.  Status post punctal plug removal, left 04/17, right 05/15/2017.      ENCOUNTER DATE:  06/22/2017.      CHIEF COMPLAINT:  Followup eyelid dermatitis.      HISTORY OF PRESENT ILLNESS:     Ms. Merritt is a pleasant 67-year-old woman who is well known to D[facility]atology [facility] presents today for followup of her eyelid dermatitis.  Since she was last seen 04/27/2017, she has had tremendous improvement in her symptomatology over the eyes.  She is status post a 16-day prednisone taper, which led to complete resolution of her ocular and periocular findings.  She last stopped her final dose of 10 mg prednisone on Sunday this week and has not had any recurrence since that time.  She has noted some mild increase in pruritus but no increased erythema, eye drainage, rash, among others.  She notes that her eye irritation and pain symptoms have improved significantly.  She did try transitioning over to doxycycline in place of the azithromycin for her ocular rosacea but has not noted any significant  difference since doing so.  The rash had already cleared prior to this transition.  She has continued to use Systane eyedrops regularly without concern.  She did try alternative drops over-the-counter that she purchased online but these did not seem to lead to resolution of her ocular symptoms.  She is in today with a pathology report outlining the same findings that were noted at the last examination, notably subacute spongiotic dermatitis from periocular skin on 05/15/2017.  She has transitioned to Protopic per her ophthalmologist and has been using this approximately every day.  She has occasionally been using Lotemax 0.5% ointment as well.  She has been trying to use the Protopic more regularly.  She does have some questions today about what the plan is going forward, if this is going to recur, among others.  She is otherwise quite pleased without any rash elsewhere.      PAST MEDICAL HISTORY:     Reviewed, noncontributory.      PAST MEDICAL HISTORY: No past medical history on file.    PAST SURGICAL HISTORY: No past surgical history on file.    FAMILY HISTORY:   Family History   Problem Relation Age of Onset     Melanoma No family hx of        SOCIAL HISTORY:   Social History   Substance Use Topics     Smoking status: Former Smoker     Smokeless tobacco: Never Used     Alcohol use 0.5 oz/week     1 Glasses of wine per week       Current Outpatient Prescriptions   Medication     Loteprednol Etabonate (LOTEMAX) 0.5 % OINT opthalmic ointment     predniSONE (DELTASONE) 20 MG tablet     calcium carbonate (OS-YOSHI 600 MG [place]. CA) 1500 (600 CA) MG tablet     Cholecalciferol ([employer] VITAMIN D3) 400 UNITS CAPS     cephALEXin (KEFLEX) 500 MG capsule     autologous serum compounded ophthalmic solution     epinastine HCl (ELESTAT) 0.05 % SOLN     hypromellose (GENTEAL) ophthalmic gel 0.3%     Polyethyl Glycol-Propyl Glycol (SYSTANE ULTRA PF OP)     tacrolimus (PROTOPIC) 0.1 % ointment     Lifitegrast (XIIDRA) 5 % SOLN      azithromycin (ZITHROMAX) 250 MG tablet     NO ACTIVE MEDICATIONS     hydrocortisone 2.5 % ointment     No current facility-administered medications for this visit.        Allergies   Allergen Reactions     Formaldehyde Other (See Comments)     Positive (+) skin patch test     Methyldibromoglutaronitrile Other (See Comments)     Doubtful Positive (+) Skin Patch Test     Other  [No Clinical Screening - See Comments] Other (See Comments)     Gold; Positive (+) skin patch test     Phenoxyethanol Other (See Comments)     Doubtful Positive (+) Skin Patch Test     Phenylmercuric Acetate Other (See Comments)     Doubtful Positive (+) Skin Patch Test     Sulfa Drugs Hives, Rash and Itching          REVIEW OF SYSTEMS:     A 4-point review of systems negative beyond that stated in above HPI.      PHYSICAL EXAMINATION:   GENERAL:  A well-appearing woman in no acute distress, appearance stated age, cooperative with examination.   SKIN:  A limited examination today including the face, neck, hands and distal arms was performed.  She notably has very mild increased tear formation overlying the conjunctivae and less so over the medial canthi.  She does have some very minimal nonspecific swelling over the periocular skin without any epidermal change and without any recurrent rash.  Her rash is largely clear entirely and we are quite pleased with this response.  Healing sites from previous biopsies without any concerning findings today.  No concerning lesions.  Essentially no erythema, crusting, mattering or scale noted today.  No lesions elsewhere.      ASSESSMENT AND PLAN:   1.  Allergic contact dermatitis/blepharitis.    Detailed history noted in previous encounters.  Notably, since her last visit, she has had near complete resolution of her periocular rash.  This is largely attributed to a recent steroid burst/taper but fortunately the patient has not had any recurrence since she has been off of the prednisone (since Sunday of  this week).  We are quite pleased with her overall response.  She has been able to transition over to Protopic well without any side effects of this medication.  She is continuing to avoid topical medication containing agents that she has been known to react to in the past via patch testing, include formaldehyde, formaldehyde releases, methyldibromo glutaronitrile, gold, phenoxyethanol and phenylmercuric acetate.    We did discuss the possibility of her eruption returning.  It is unclear whether or not this is going to recur or if her remission may be sustained.  We are optimistic for the latter.  She asked some questions about what her next step might be if the rashes recur.  It would be reasonable to restart her on a low-dose short course of prednisone at 20 mg per day with consideration of transitioning to a steroid-sparing agent long term if she continues to require systemic treatment like cyclosporine if necessary.  We will cross this bridge in the future going forward.    -At this point, we will have her continue with twice daily Protopic to periocular skin.    -She will continue on azithromycin for ocular rosacea 5 days out of the month as recommended by her ophthalmologist.  -She can continue to use Lotemax 0.5% b.i.d. ointment for up to 1-2 weeks with flares of her ocular findings.   - If things become poorly controlled, she should call in and we will be happy to reevaluate and/or prescribe a short course of oral prednisone at dosage as noted above to obtain better control.    -She will continue to avoid any topical medications and products that contain known contact allergens.   - Continue gentle skin care.   - Continue followup as recommended indicated by her ophthalmologist.     Followup in 3 months or sooner pending response to above treatment.  If she is completely clear in 3 months, there is no need for her to continue to follow up with us regularly.          Dr. Janee Hand staffed the patient.       Staff Involved:   Dictated by Resident (Alireza Thayer MD)/Staff(as above)     This note was dictated and edited by MD Alireza Valera MD  PGY2 Dermatology  [phone]   .I, Janee Hand MD, saw this patient with the resident and agree with the resident s findings and plan of care as documented in the resident s note.       cc:   Moiz Peters MD    [facility]   [address], [place] [address] [place] [phone]      Steve Barton MD   [facility]   [address] [place] [address]

## 2017-08-25 ENCOUNTER — TRANSFERRED RECORDS (OUTPATIENT)
Dept: HEALTH INFORMATION MANAGEMENT | Facility: CLINIC | Age: 67
End: 2017-08-25

## 2019-09-29 ENCOUNTER — HEALTH MAINTENANCE LETTER (OUTPATIENT)
Age: 69
End: 2019-09-29

## 2020-03-15 ENCOUNTER — HEALTH MAINTENANCE LETTER (OUTPATIENT)
Age: 70
End: 2020-03-15

## 2020-07-14 ENCOUNTER — TRANSFERRED RECORDS (OUTPATIENT)
Dept: HEALTH INFORMATION MANAGEMENT | Facility: CLINIC | Age: 70
End: 2020-07-14

## 2020-11-13 ENCOUNTER — TRANSFERRED RECORDS (OUTPATIENT)
Dept: HEALTH INFORMATION MANAGEMENT | Facility: CLINIC | Age: 70
End: 2020-11-13

## 2021-01-14 ENCOUNTER — HEALTH MAINTENANCE LETTER (OUTPATIENT)
Age: 71
End: 2021-01-14

## 2021-05-08 ENCOUNTER — HEALTH MAINTENANCE LETTER (OUTPATIENT)
Age: 71
End: 2021-05-08

## 2021-10-23 ENCOUNTER — HEALTH MAINTENANCE LETTER (OUTPATIENT)
Age: 71
End: 2021-10-23

## 2022-06-04 ENCOUNTER — HEALTH MAINTENANCE LETTER (OUTPATIENT)
Age: 72
End: 2022-06-04

## 2022-10-10 ENCOUNTER — HEALTH MAINTENANCE LETTER (OUTPATIENT)
Age: 72
End: 2022-10-10

## 2023-06-11 ENCOUNTER — HEALTH MAINTENANCE LETTER (OUTPATIENT)
Age: 73
End: 2023-06-11

## 2023-10-29 ENCOUNTER — HEALTH MAINTENANCE LETTER (OUTPATIENT)
Age: 73
End: 2023-10-29